# Patient Record
Sex: MALE | Race: BLACK OR AFRICAN AMERICAN | NOT HISPANIC OR LATINO | URBAN - METROPOLITAN AREA
[De-identification: names, ages, dates, MRNs, and addresses within clinical notes are randomized per-mention and may not be internally consistent; named-entity substitution may affect disease eponyms.]

---

## 2022-02-02 ENCOUNTER — HISTORICAL (OUTPATIENT)
Dept: INTERNAL MEDICINE | Facility: CLINIC | Age: 42
End: 2022-02-02

## 2022-02-02 LAB
ABS NEUT (OLG): 5.55 (ref 2.1–9.2)
ALBUMIN SERPL-MCNC: 3.6 G/DL (ref 3.5–5)
ALBUMIN/GLOB SERPL: 1.3 {RATIO} (ref 1.1–2)
ALP SERPL-CCNC: 63 U/L (ref 40–150)
ALT SERPL-CCNC: 23 U/L (ref 0–55)
APPEARANCE, UA: CLEAR
AST SERPL-CCNC: 19 U/L (ref 5–34)
BACTERIA SPEC CULT: NORMAL
BASOPHILS # BLD AUTO: 0.1 10*3/UL (ref 0–0.2)
BASOPHILS NFR BLD AUTO: 1 %
BILIRUB SERPL-MCNC: 0.5 MG/DL
BILIRUB UR QL STRIP: NEGATIVE
BILIRUBIN DIRECT+TOT PNL SERPL-MCNC: 0.2 (ref 0–0.5)
BILIRUBIN DIRECT+TOT PNL SERPL-MCNC: 0.3 (ref 0–0.8)
BUN SERPL-MCNC: 11.4 MG/DL (ref 8.9–20.6)
CALCIUM SERPL-MCNC: 9.3 MG/DL (ref 8.7–10.5)
CHLORIDE SERPL-SCNC: 109 MMOL/L (ref 98–107)
CHOLEST SERPL-MCNC: 150 MG/DL
CHOLEST/HDLC SERPL: 3 {RATIO} (ref 0–5)
CO2 SERPL-SCNC: 26 MMOL/L (ref 22–29)
COLOR UR: YELLOW
CREAT SERPL-MCNC: 1.03 MG/DL (ref 0.73–1.18)
EOSINOPHIL # BLD AUTO: 0.5 10*3/UL (ref 0–0.9)
EOSINOPHIL NFR BLD AUTO: 5 %
ERYTHROCYTE [DISTWIDTH] IN BLOOD BY AUTOMATED COUNT: 15.6 % (ref 11.5–14.5)
EST. AVERAGE GLUCOSE BLD GHB EST-MCNC: 111.2 MG/DL
FLAG2 (OHS): 60
FLAG3 (OHS): 90
FLAGS (OHS): 80
GLOBULIN SER-MCNC: 2.8 G/DL (ref 2.4–3.5)
GLUCOSE (UA): NORMAL
GLUCOSE SERPL-MCNC: 96 MG/DL (ref 74–100)
HBA1C MFR BLD: 5.5 %
HCT VFR BLD AUTO: 43.3 % (ref 40–51)
HDLC SERPL-MCNC: 49 MG/DL (ref 35–60)
HEMOLYSIS INTERF INDEX SERPL-ACNC: 5
HGB BLD-MCNC: 14.8 G/DL (ref 13.5–17.5)
HGB UR QL STRIP: NORMAL
HIV 1+2 AB+HIV1 P24 AG SERPL QL IA: 0.05
HIV 1+2 AB+HIV1 P24 AG SERPL QL IA: NONREACTIVE
HYALINE CASTS #/AREA URNS LPF: NORMAL /[LPF]
ICTERIC INTERF INDEX SERPL-ACNC: 0
IMM GRANULOCYTES # BLD AUTO: 0.01 10*3/UL
IMM GRANULOCYTES NFR BLD AUTO: 0 %
KETONES UR QL STRIP: NEGATIVE
LDLC SERPL CALC-MCNC: 84 MG/DL (ref 50–140)
LEUKOCYTE ESTERASE UR QL STRIP: NEGATIVE
LIPEMIC INTERF INDEX SERPL-ACNC: 6
LOW EVENT # SUSPECT FLAG (OHS): 80
LYMPHOCYTES # BLD AUTO: 2.8 10*3/UL (ref 0.6–4.6)
LYMPHOCYTES NFR BLD AUTO: 30 %
MANUAL DIFF? (OHS): NO
MCH RBC QN AUTO: 28.9 PG (ref 26–34)
MCHC RBC AUTO-ENTMCNC: 34.2 G/DL (ref 31–37)
MCV RBC AUTO: 84.6 FL (ref 80–100)
MO BLASTS SUSPECT FLAG (OHS): 40
MONOCYTES # BLD AUTO: 0.6 10*3/UL (ref 0.1–1.3)
MONOCYTES NFR BLD AUTO: 6 %
MUCOUS THREADS URNS QL MICRO: NORMAL
NEUTROPHILS # BLD AUTO: 5.55 10*3/UL (ref 2.1–9.2)
NEUTROPHILS NFR BLD AUTO: 59 %
NITRITE UR QL STRIP: NEGATIVE
NRBC BLD AUTO-RTO: 0 % (ref 0–0.2)
PH UR STRIP: 5.5 [PH] (ref 4.5–8)
PLATELET # BLD AUTO: 159 10*3/UL (ref 130–400)
PLATELET CLUMPS SUSPECT FLAG (OHS): 40
PMV BLD AUTO: 12.4 FL (ref 7.4–10.4)
POTASSIUM SERPL-SCNC: 4 MMOL/L (ref 3.5–5.1)
PROT SERPL-MCNC: 6.4 G/DL (ref 6.4–8.3)
PROT UR QL STRIP: NORMAL
PSA SERPL-MCNC: 0.79 NG/ML
RBC # BLD AUTO: 5.12 10*6/UL (ref 4.5–5.9)
RBC #/AREA URNS HPF: NORMAL /[HPF] (ref 0–5)
SODIUM SERPL-SCNC: 143 MMOL/L (ref 136–145)
SP GR UR STRIP: 1.03 (ref 1–1.03)
SQUAMOUS EPITHELIAL, UA: NORMAL
TRIGL SERPL-MCNC: 86 MG/DL (ref 34–140)
TSH SERPL-ACNC: 2.49 M[IU]/L (ref 0.35–4.94)
UROBILINOGEN UR STRIP-ACNC: NORMAL
VLDLC SERPL CALC-MCNC: 17 MG/DL
WBC # SPEC AUTO: 9.5 10*3/UL (ref 4.5–11)
WBC #/AREA URNS HPF: NORMAL /[HPF] (ref 0–5)

## 2022-04-08 ENCOUNTER — HISTORICAL (OUTPATIENT)
Dept: RADIOLOGY | Facility: HOSPITAL | Age: 42
End: 2022-04-08

## 2022-04-08 ENCOUNTER — HISTORICAL (OUTPATIENT)
Dept: ADMINISTRATIVE | Facility: HOSPITAL | Age: 42
End: 2022-04-08

## 2022-04-10 ENCOUNTER — HISTORICAL (OUTPATIENT)
Dept: ADMINISTRATIVE | Facility: HOSPITAL | Age: 42
End: 2022-04-10
Payer: MEDICAID

## 2022-04-29 VITALS
HEIGHT: 66 IN | SYSTOLIC BLOOD PRESSURE: 138 MMHG | WEIGHT: 235.69 LBS | DIASTOLIC BLOOD PRESSURE: 86 MMHG | BODY MASS INDEX: 37.88 KG/M2

## 2022-05-17 PROBLEM — I70.90 ATHEROSCLEROSIS OF ARTERIES: Status: ACTIVE | Noted: 2022-05-17

## 2022-05-17 PROBLEM — N30.90 CYSTITIS: Status: ACTIVE | Noted: 2022-05-17

## 2022-05-17 PROBLEM — M50.30 DDD (DEGENERATIVE DISC DISEASE), CERVICAL: Status: ACTIVE | Noted: 2022-05-17

## 2022-05-17 PROBLEM — M51.36 DDD (DEGENERATIVE DISC DISEASE), LUMBAR: Status: ACTIVE | Noted: 2022-05-17

## 2022-05-17 PROBLEM — I10 HYPERTENSION: Status: ACTIVE | Noted: 2022-05-17

## 2022-05-17 PROBLEM — E66.9 OBESITY: Status: ACTIVE | Noted: 2022-05-17

## 2022-05-17 PROBLEM — H54.40 BLINDNESS OF RIGHT EYE: Status: ACTIVE | Noted: 2022-05-17

## 2022-05-17 PROBLEM — R31.29 MICROHEMATURIA: Status: ACTIVE | Noted: 2022-05-17

## 2022-05-17 PROBLEM — M51.369 DDD (DEGENERATIVE DISC DISEASE), LUMBAR: Status: ACTIVE | Noted: 2022-05-17

## 2022-05-17 NOTE — ASSESSMENT & PLAN NOTE
Refer to cardiovascular specialist  Discussed adverse effect of TBO on CV health and encouraged cessation

## 2022-05-17 NOTE — PROGRESS NOTES
"  EMMANUEL Hooper   [facility]  [address] [facility] [address]      PATIENT NAME: Edin Branch  : 1980  DATE: 22  MRN: [id number]      Billing Provider: EMMANUEL Hooper  Level of Service:   Patient PCP Information     None on File          Reason for Visit / Chief Complaint: Follow-up (CT results)       History of Present Illness / Problem Focused Workflow     Edin Branch presents to the clinic with Follow-up (CT results)     Initial Visit (2021): 41 y.o. male presenting to [facility] to establish primary care.   Previous PCP: None   PmHx: TBO, DDD neck and lumbar spine   SHx: Denies   FHx: Oral cancer (mother, maternal grandfather; both smokers), throat cancer (maternal grandmother), HTN, DM, Heart Attack (father at age of 35 and paternal grandfather--age at death unknown, but  in )   Complaints today: Establish primary care.   ED visit on 21 with c/o paresthesias to left arm and left leg. He underwent CT C-spine and L-spine consistent with degenerative disc disease. He denies any major injuries or accidents during his lifetime, but admits playing football from childhood through high school. He is currently taking Gabapentin and Prednisone. He received a Toradol and steroid injection in the ED. States pain somewhat improved, but he is still experiencing some discomfort in LLE with prolonged ambulation. Denies falls.   Pt was also diagnosed with HTN during ED visit and prescribed Lisinopril 5 mg daily. SBP was noted as high as 200s. Pt admits that he never obtained the Lisinopril as he attributed the elevated BP to pain. States "never had it (HTN) before...I don't like a lot of medicine." BP improved today. He denies fever, chills, HA, weakness, dizziness, chest pain, SOB, lower ext edema, or blurred vision.    (2022): Pt presenting for f/u to review results. All labs reviewed and unremarkable.   PSA WNL   " "HgA1c 5.5   CBC, CMP, Lipid, TSH unremarkable or WNL   HIV, Hepatitis, GC negative   Trace blood on UA. Seen on UA years ago. Denies gross hematuria. He does smoke cigarettes.     Bp at goal today. He is still not taking the Lisinopril. He continues with c/o neck and lower back pain radiating to LLE. Previously given a trial of Mobic as he reported nothing worked in the past. States Mobic was not effective. He now has health insurance and is eager to go to [employer]. States ready to get back to work. No other concerns.    Today's Visit (5/18/2022): Pt presenting for f/u to review results of CT A/P. He underwent CT on 4/8/22 to further evaluate microhematuria. Significant findings included:  "There is no pelvic free fluid. The abdominal aorta is normal in  caliber. There is moderate atherosclerotic disease which is advanced  for patient's age. No retroperitoneal adenopathy.      Mild degenerative change of the spine primarily at L5-S1. Small  fat-containing paraumbilical hernia.     Impression.  1. Mild bladder wall thickening probably accentuated by under  distention but cystitis is possible.  2. No urinary tract calculi or enhancing masses."  Patient is a smoker. He denies dysuria, overt blood in urine, or abnl urine color or odor.       Review of Systems     Review of Systems   All other systems reviewed and are negative.      Medical / Social / Family History   History reviewed. No pertinent past medical history.    Past Surgical History:   Procedure Laterality Date    FINGER SURGERY         Social History    reports that he has been smoking cigarettes. He has been smoking about 0.50 packs per day. He has never used smokeless tobacco. He reports current alcohol use of about 6.0 standard drinks of alcohol per week. He reports previous drug use.    Family History  's family history includes Cancer in his mother; Diabetes in his brother and maternal grandmother; Heart attack in his father and paternal grandmother; " Hypertension in his maternal grandmother; Stomach cancer in his maternal grandfather.    Medications and Allergies     Medications  Outpatient Medications Marked as Taking for the 5/18/22 encounter (Office Visit) with EMMANUEL Hooper   Medication Sig Dispense Refill    aspirin/caffeine (CECILIA BACK AND BODY ORAL) Take by mouth.      cyclobenzaprine (FLEXERIL) 10 MG tablet Take 10 mg by mouth.         Allergies  Review of patient's allergies indicates:  No Known Allergies    Physical Examination     Vitals:    05/18/22 0807   BP: 126/81   Pulse: 65   Resp: 20   Temp: 97.8 °F (36.6 °C)     Physical Exam  Constitutional:       Appearance: Normal appearance.   HENT:      Head: Normocephalic and atraumatic.   Eyes:      Extraocular Movements: Extraocular movements intact.   Cardiovascular:      Rate and Rhythm: Normal rate and regular rhythm.      Heart sounds: Normal heart sounds.   Pulmonary:      Effort: Pulmonary effort is normal.      Breath sounds: Normal breath sounds.   Abdominal:      General: Bowel sounds are normal.      Palpations: Abdomen is soft.      Tenderness: There is no right CVA tenderness or left CVA tenderness.   Musculoskeletal:         General: Normal range of motion.   Skin:     General: Skin is warm and dry.   Neurological:      General: No focal deficit present.      Mental Status: He is alert and oriented to person, place, and time.   Psychiatric:         Mood and Affect: Mood normal.         Behavior: Behavior normal.         Thought Content: Thought content normal.         Judgment: Judgment normal.           Results     Lab Results   Component Value Date    WBC 9.5 02/02/2022    RBC 5.12 02/02/2022    HGB 14.8 02/02/2022    HCT 43.3 02/02/2022    MCV 84.6 02/02/2022    MCH 28.9 02/02/2022    MCHC 34.2 02/02/2022    RDW 15.6 02/02/2022     02/02/2022    MPV 12.4 02/02/2022     CMP  Sodium Level   Date Value Ref Range Status   02/02/2022 143 136 - 145      Potassium Level   Date  Value Ref Range Status   02/02/2022 4.0 3.5 - 5.1      Carbon Dioxide   Date Value Ref Range Status   02/02/2022 26 22 - 29      Blood Urea Nitrogen   Date Value Ref Range Status   02/02/2022 11.4 8.9 - 20.6      Creatinine   Date Value Ref Range Status   02/02/2022 1.03 0.73 - 1.18      Calcium Level Total   Date Value Ref Range Status   02/02/2022 9.3 8.7 - 10.5      Albumin Level   Date Value Ref Range Status   02/02/2022 3.6 3.5 - 5.0      Bilirubin Total   Date Value Ref Range Status   02/02/2022 0.5 <=1.5      Alkaline Phosphatase   Date Value Ref Range Status   02/02/2022 63 40 - 150      Aspartate Aminotransferase   Date Value Ref Range Status   02/02/2022 19 5 - 34      Alanine Aminotransferase   Date Value Ref Range Status   02/02/2022 23 0 - 55      Estimated GFR-Non    Date Value Ref Range Status   02/02/2022 85 >=90      Lab Results   Component Value Date    CHOL 150 02/02/2022     Lab Results   Component Value Date    HDL 49 02/02/2022     No results found for: LDLCALC  Lab Results   Component Value Date    TRIG 86 02/02/2022     No results found for: CHOLHDL  Lab Results   Component Value Date    TSH 2.4880 02/02/2022     Lab Results   Component Value Date    PHUR 5.5 02/02/2022    PROTEINUA Trace 02/02/2022    GLUCUA Normal 02/02/2022    KETONESU Negative 02/02/2022    OCCULTUA Trace 02/02/2022    NITRITE Negative 02/02/2022    LEUKOCYTESUR Negative 02/02/2022           Assessment and Plan (including Health Maintenance)     Plan:         Health Maintenance Due   Topic Date Due    Hepatitis C Screening  Never done    COVID-19 Vaccine (1) Never done    Pneumococcal Vaccines (Age 0-64) (1 - PCV) Never done    TETANUS VACCINE  Never done    High Dose Statin  Never done       Problem List Items Addressed This Visit        Cardiac/Vascular    Atherosclerosis of arteries    Current Assessment & Plan     Refer to cardiovascular specialist  Discussed adverse effect of TBO on CV health and  encouraged cessation           Relevant Orders    Ambulatory referral/consult to Cardiology       Renal/    Microhematuria    Overview     CT A/P 4/8/22:     Impression.  1. Mild bladder wall thickening probably accentuated by under  distention but cystitis is possible.  2. No urinary tract calculi or enhancing masses.                Current Assessment & Plan     Risk: TBO  Urine studies today  Refer to Uro           Relevant Orders    Ambulatory referral/consult to Urology    Cystitis    Current Assessment & Plan     Urine studies           Relevant Orders    Urinalysis, Reflex to Urine Culture Urine, Clean Catch    Urine culture          Health Maintenance Topics with due status: Not Due       Topic Last Completion Date    Lipid Panel 02/02/2022    Influenza Vaccine Not Due       Future Appointments   Date Time Provider Department Center   8/9/2022  8:15 AM EMMANUEL Hooper [facility] [place]            Signature:  EMMANUEL Hooper  [facility]  [address] [facility] [facility] [address]    Date of encounter: 5/18/22

## 2022-05-18 ENCOUNTER — OFFICE VISIT (OUTPATIENT)
Dept: INTERNAL MEDICINE | Facility: CLINIC | Age: 42
End: 2022-05-18
Payer: MEDICAID

## 2022-05-18 ENCOUNTER — LAB VISIT (OUTPATIENT)
Dept: LAB | Facility: HOSPITAL | Age: 42
End: 2022-05-18
Attending: NURSE PRACTITIONER
Payer: MEDICAID

## 2022-05-18 VITALS
SYSTOLIC BLOOD PRESSURE: 126 MMHG | HEART RATE: 65 BPM | RESPIRATION RATE: 20 BRPM | WEIGHT: 230.19 LBS | BODY MASS INDEX: 34.09 KG/M2 | HEIGHT: 69 IN | DIASTOLIC BLOOD PRESSURE: 81 MMHG | TEMPERATURE: 98 F

## 2022-05-18 DIAGNOSIS — N30.90 CYSTITIS: ICD-10-CM

## 2022-05-18 DIAGNOSIS — R31.29 MICROHEMATURIA: ICD-10-CM

## 2022-05-18 DIAGNOSIS — I70.90 ATHEROSCLEROSIS OF ARTERIES: ICD-10-CM

## 2022-05-18 LAB
APPEARANCE UR: CLEAR
BACTERIA #/AREA URNS AUTO: ABNORMAL /HPF
BILIRUB UR QL STRIP.AUTO: NEGATIVE MG/DL
COLOR UR AUTO: YELLOW
GLUCOSE UR QL STRIP.AUTO: NORMAL MG/DL
HYALINE CASTS #/AREA URNS LPF: ABNORMAL /LPF
KETONES UR QL STRIP.AUTO: ABNORMAL MG/DL
LEUKOCYTE ESTERASE UR QL STRIP.AUTO: 75 UNIT/L
MUCOUS THREADS URNS QL MICRO: ABNORMAL /LPF
NITRITE UR QL STRIP.AUTO: NEGATIVE
PH UR STRIP.AUTO: 6 [PH]
PROT UR QL STRIP.AUTO: ABNORMAL MG/DL
RBC #/AREA URNS AUTO: ABNORMAL /HPF
RBC UR QL AUTO: ABNORMAL UNIT/L
SP GR UR STRIP.AUTO: 1.03
SQUAMOUS #/AREA URNS LPF: ABNORMAL /HPF
UROBILINOGEN UR STRIP-ACNC: NORMAL MG/DL
WBC #/AREA URNS AUTO: ABNORMAL /HPF

## 2022-05-18 PROCEDURE — 1159F PR MEDICATION LIST DOCUMENTED IN MEDICAL RECORD: ICD-10-PCS | Mod: CPTII,,, | Performed by: NURSE PRACTITIONER

## 2022-05-18 PROCEDURE — 99214 OFFICE O/P EST MOD 30 MIN: CPT | Mod: PBBFAC | Performed by: NURSE PRACTITIONER

## 2022-05-18 PROCEDURE — 87088 URINE BACTERIA CULTURE: CPT

## 2022-05-18 PROCEDURE — 1159F MED LIST DOCD IN RCRD: CPT | Mod: CPTII,,, | Performed by: NURSE PRACTITIONER

## 2022-05-18 PROCEDURE — 3008F BODY MASS INDEX DOCD: CPT | Mod: CPTII,,, | Performed by: NURSE PRACTITIONER

## 2022-05-18 PROCEDURE — 3074F SYST BP LT 130 MM HG: CPT | Mod: CPTII,,, | Performed by: NURSE PRACTITIONER

## 2022-05-18 PROCEDURE — 3074F PR MOST RECENT SYSTOLIC BLOOD PRESSURE < 130 MM HG: ICD-10-PCS | Mod: CPTII,,, | Performed by: NURSE PRACTITIONER

## 2022-05-18 PROCEDURE — 99213 OFFICE O/P EST LOW 20 MIN: CPT | Mod: S$PBB,,, | Performed by: NURSE PRACTITIONER

## 2022-05-18 PROCEDURE — 1160F PR REVIEW ALL MEDS BY PRESCRIBER/CLIN PHARMACIST DOCUMENTED: ICD-10-PCS | Mod: CPTII,,, | Performed by: NURSE PRACTITIONER

## 2022-05-18 PROCEDURE — 81001 URINALYSIS AUTO W/SCOPE: CPT

## 2022-05-18 PROCEDURE — 3079F DIAST BP 80-89 MM HG: CPT | Mod: CPTII,,, | Performed by: NURSE PRACTITIONER

## 2022-05-18 PROCEDURE — 3008F PR BODY MASS INDEX (BMI) DOCUMENTED: ICD-10-PCS | Mod: CPTII,,, | Performed by: NURSE PRACTITIONER

## 2022-05-18 PROCEDURE — 99213 PR OFFICE/OUTPT VISIT, EST, LEVL III, 20-29 MIN: ICD-10-PCS | Mod: S$PBB,,, | Performed by: NURSE PRACTITIONER

## 2022-05-18 PROCEDURE — 3079F PR MOST RECENT DIASTOLIC BLOOD PRESSURE 80-89 MM HG: ICD-10-PCS | Mod: CPTII,,, | Performed by: NURSE PRACTITIONER

## 2022-05-18 PROCEDURE — 1160F RVW MEDS BY RX/DR IN RCRD: CPT | Mod: CPTII,,, | Performed by: NURSE PRACTITIONER

## 2022-05-18 RX ORDER — GABAPENTIN 300 MG/1
CAPSULE ORAL
COMMUNITY
Start: 2021-12-13

## 2022-05-18 RX ORDER — CYCLOBENZAPRINE HCL 10 MG
10 TABLET ORAL 3 TIMES DAILY PRN
COMMUNITY
Start: 2022-02-09 | End: 2023-02-09 | Stop reason: SDUPTHER

## 2022-05-18 NOTE — LETTER
May 18, 2022      [facility]  [address] [facility] [address] [facility] [address]  Phone: [phone]       Patient: Edin Branch   YOB: 1980  Date of Visit: 05/18/2022    To Whom It May Concern:    Sheeba Branch  was at [facility] on 05/18/2022. The patient may return to work/school on 5/19/2022 without restrictions. If you have any questions or concerns, or if I can be of further assistance, please do not hesitate to contact me.    Sincerely,    EMMANUEL Hooper

## 2022-05-20 ENCOUNTER — TELEPHONE (OUTPATIENT)
Dept: INTERNAL MEDICINE | Facility: CLINIC | Age: 42
End: 2022-05-20
Payer: MEDICAID

## 2022-05-20 LAB — BACTERIA UR CULT: NO GROWTH

## 2022-05-20 NOTE — TELEPHONE ENCOUNTER
----- Message from EMMANUEL Hooper sent at 5/20/2022  3:05 PM CDT -----  Please inform patient that urine culture was negative. Will await Urology appt.

## 2022-07-24 ENCOUNTER — HOSPITAL ENCOUNTER (EMERGENCY)
Facility: HOSPITAL | Age: 42
Discharge: HOME | End: 2022-07-25
Attending: FAMILY MEDICINE
Payer: MEDICAID

## 2022-07-24 DIAGNOSIS — U07.1 COVID: Primary | ICD-10-CM

## 2022-07-24 DIAGNOSIS — R07.9 CHEST PAIN: ICD-10-CM

## 2022-07-24 LAB
ALBUMIN SERPL-MCNC: 4.1 GM/DL (ref 3.5–5)
ALBUMIN/GLOB SERPL: 1.4 RATIO (ref 1.1–2)
ALP SERPL-CCNC: 70 UNIT/L (ref 40–150)
ALT SERPL-CCNC: 29 UNIT/L (ref 0–55)
AST SERPL-CCNC: 28 UNIT/L (ref 5–34)
BASOPHILS # BLD AUTO: 0.04 X10(3)/MCL (ref 0–0.2)
BASOPHILS NFR BLD AUTO: 0.4 %
BILIRUBIN DIRECT+TOT PNL SERPL-MCNC: 0.9 MG/DL
BNP BLD-MCNC: 25.3 PG/ML
BUN SERPL-MCNC: 9 MG/DL (ref 8.9–20.6)
CALCIUM SERPL-MCNC: 9.4 MG/DL (ref 8.4–10.2)
CHLORIDE SERPL-SCNC: 102 MMOL/L (ref 98–107)
CO2 SERPL-SCNC: 19 MMOL/L (ref 22–29)
CREAT SERPL-MCNC: 1.03 MG/DL (ref 0.73–1.18)
EOSINOPHIL # BLD AUTO: 0.05 X10(3)/MCL (ref 0–0.9)
EOSINOPHIL NFR BLD AUTO: 0.6 %
ERYTHROCYTE [DISTWIDTH] IN BLOOD BY AUTOMATED COUNT: 16 % (ref 11.5–17)
FLUAV AG UPPER RESP QL IA.RAPID: NOT DETECTED
FLUBV AG UPPER RESP QL IA.RAPID: NOT DETECTED
GLOBULIN SER-MCNC: 3 GM/DL (ref 2.4–3.5)
GLUCOSE SERPL-MCNC: 86 MG/DL (ref 74–100)
HCT VFR BLD AUTO: 48.9 % (ref 42–52)
HGB BLD-MCNC: 16.6 GM/DL (ref 14–18)
IMM GRANULOCYTES # BLD AUTO: 0.07 X10(3)/MCL (ref 0–0.04)
IMM GRANULOCYTES NFR BLD AUTO: 0.8 %
LYMPHOCYTES # BLD AUTO: 0.46 X10(3)/MCL (ref 0.6–4.6)
LYMPHOCYTES NFR BLD AUTO: 5.1 %
MCH RBC QN AUTO: 28.5 PG (ref 27–31)
MCHC RBC AUTO-ENTMCNC: 33.9 MG/DL (ref 33–36)
MCV RBC AUTO: 83.9 FL (ref 80–94)
MONOCYTES # BLD AUTO: 0.72 X10(3)/MCL (ref 0.1–1.3)
MONOCYTES NFR BLD AUTO: 8 %
NEUTROPHILS # BLD AUTO: 7.6 X10(3)/MCL (ref 2.1–9.2)
NEUTROPHILS NFR BLD AUTO: 85.1 %
NRBC BLD AUTO-RTO: 0 %
PLATELET # BLD AUTO: 133 X10(3)/MCL (ref 130–400)
PLATELETS.RETICULATED NFR BLD AUTO: 10.1 % (ref 0.9–11.2)
PMV BLD AUTO: 12.1 FL (ref 7.4–10.4)
POTASSIUM SERPL-SCNC: 3.9 MMOL/L (ref 3.5–5.1)
PROT SERPL-MCNC: 7.1 GM/DL (ref 6.4–8.3)
RBC # BLD AUTO: 5.83 X10(6)/MCL (ref 4.7–6.1)
SARS-COV-2 RNA RESP QL NAA+PROBE: DETECTED
SODIUM SERPL-SCNC: 135 MMOL/L (ref 136–145)
TROPONIN I SERPL-MCNC: <0.01 NG/ML (ref 0–0.04)
WBC # SPEC AUTO: 9 X10(3)/MCL (ref 4.5–11.5)

## 2022-07-24 PROCEDURE — 84484 ASSAY OF TROPONIN QUANT: CPT | Performed by: FAMILY MEDICINE

## 2022-07-24 PROCEDURE — 25000003 PHARM REV CODE 250: Performed by: FAMILY MEDICINE

## 2022-07-24 PROCEDURE — 87636 SARSCOV2 & INF A&B AMP PRB: CPT | Performed by: FAMILY MEDICINE

## 2022-07-24 PROCEDURE — 93005 ELECTROCARDIOGRAM TRACING: CPT

## 2022-07-24 PROCEDURE — 80053 COMPREHEN METABOLIC PANEL: CPT | Performed by: FAMILY MEDICINE

## 2022-07-24 PROCEDURE — 85025 COMPLETE CBC W/AUTO DIFF WBC: CPT | Performed by: FAMILY MEDICINE

## 2022-07-24 PROCEDURE — 36415 COLL VENOUS BLD VENIPUNCTURE: CPT | Performed by: FAMILY MEDICINE

## 2022-07-24 PROCEDURE — 83880 ASSAY OF NATRIURETIC PEPTIDE: CPT | Performed by: FAMILY MEDICINE

## 2022-07-24 PROCEDURE — 99285 EMERGENCY DEPT VISIT HI MDM: CPT | Mod: 25

## 2022-07-24 RX ORDER — IBUPROFEN 600 MG/1
600 TABLET ORAL ONCE
Status: COMPLETED | OUTPATIENT
Start: 2022-07-24 | End: 2022-07-24

## 2022-07-24 RX ADMIN — SODIUM CHLORIDE 1000 ML: 9 INJECTION, SOLUTION INTRAVENOUS at 09:07

## 2022-07-24 RX ADMIN — IBUPROFEN 600 MG: 600 TABLET ORAL at 09:07

## 2022-07-24 NOTE — Clinical Note
"Edin"Delonte Branch was seen and treated in our emergency department on 7/24/2022.     COVID-19 is present in our communities across the state. There is limited testing for COVID at this time, so not all patients can be tested. In this situation, your employee meets the following criteria:    Edin Branch has met the criteria for COVID-19 testing and has a POSITIVE result. He can return to work once they are asymptomatic for 24 hours without the use of fever reducing medications AND at least five days from the first positive result. A mask is recommended for 5 days post quarantine.     If you have any questions or concerns, or if I can be of further assistance, please do not hesitate to contact me.    Sincerely,             carmen encinas RN"

## 2022-07-25 VITALS
BODY MASS INDEX: 35.5 KG/M2 | HEIGHT: 66 IN | TEMPERATURE: 97 F | WEIGHT: 220.88 LBS | HEART RATE: 78 BPM | DIASTOLIC BLOOD PRESSURE: 63 MMHG | RESPIRATION RATE: 14 BRPM | SYSTOLIC BLOOD PRESSURE: 111 MMHG | OXYGEN SATURATION: 100 %

## 2022-07-25 NOTE — DISCHARGE INSTRUCTIONS
You came into emergency department with multiple symptoms and tested positive for COVID.  Otherwise her labs and chest x-ray were normal.    You can take the following medications to help you with the symptoms that you have:  For fevers and sore throat you can take Tylenol/ibuprofen.   For cough you can  a cough suppressant with dextromethorphan/guaifenesin (over-the-counter medications should have DM listed).  For nasal congestion/stuffiness you can try nasal sprays (Ex. Flonase) or try oral medications such as phenylephrine (over-the-counter medications should have PE listed).  For weakness and fatigue, I recommend good oral hydration with water/Pedialyte/Gatorade/Powerade.     Follow-up with her primary care doctor for re-evaluation.  Please quarantine herself so that other people do not get sick.  Return to the emergency department if chest pain, trouble breathing, feel lightheaded or dizzy like you may pass out.

## 2022-07-25 NOTE — ED PROVIDER NOTES
Name: Edin Branch   Age: 41 y.o.  Sex: male    Chief complaint:   Chief Complaint   Patient presents with    Chest Pain     Awokened this PM with chest pain radiating into ant. Jaw, N&V.      Patient arrived with: Private  History obtained from: Patient    Subjective:   41-year-old male with a history of CAD presents to the emergency department for chest pain.  Patient said all the symptoms started earlier today.  Pain is generalized in the chest, sharp in nature, waxes and wanes, nonradiating.  Pain is not worse with exertion.  He feels some occasional heaviness and a straw but it does not feel like his chest pain.  Complains of fever with a T-max of 101.9° for which he is taking Tylenol.  Also complains of chills and sweats.  Complains of a nonproductive cough and sore throat.  Has a mild headache.  No changes in vision or hearing.  Denies abdominal pain.  Complains of nausea with 2 episodes of nonbloody vomiting.  Denies diarrhea, dysuria, hematuria.  Patient has not been vaccinated for COVID.  No known sick contacts.    No past medical history on file.  Past Surgical History:   Procedure Laterality Date    FINGER SURGERY       Social History     Socioeconomic History    Marital status: Single    Number of children: 4    Highest education level: 10th grade   Tobacco Use    Smoking status: Current Every Day Smoker     Packs/day: 0.50     Types: Cigarettes    Smokeless tobacco: Never Used   Substance and Sexual Activity    Alcohol use: Yes     Alcohol/week: 6.0 standard drinks     Types: 6 Shots of liquor per week    Drug use: Not Currently    Sexual activity: Yes     Partners: Female     Review of patient's allergies indicates:  No Known Allergies     Review of Systems   Constitutional: Positive for chills, diaphoresis and fever.   HENT: Positive for sore throat. Negative for congestion.    Eyes: Negative for pain and discharge.   Respiratory: Positive for cough. Negative for shortness of breath.     Cardiovascular: Positive for chest pain. Negative for palpitations.   Gastrointestinal: Positive for vomiting. Negative for diarrhea.   Genitourinary: Negative for dysuria and hematuria.   Musculoskeletal: Negative for back pain and myalgias.   Skin: Negative for itching and rash.   Neurological: Positive for headaches. Negative for weakness.          Objective:     Vitals:    07/24/22 2014   BP: 118/78   Pulse: (!) 118   Resp: 20   Temp: 96.8 °F (36 °C)        Physical Exam  Constitutional:       Appearance: He is not toxic-appearing.   HENT:      Head: Normocephalic and atraumatic.   Cardiovascular:      Rate and Rhythm: Regular rhythm.      Pulses: Normal pulses.   Pulmonary:      Effort: Pulmonary effort is normal.      Breath sounds: Normal breath sounds.   Abdominal:      General: Abdomen is flat. Bowel sounds are normal.      Palpations: Abdomen is soft.      Tenderness: There is no right CVA tenderness or left CVA tenderness.   Musculoskeletal:         General: No deformity. Normal range of motion.      Cervical back: Normal range of motion and neck supple.      Right lower leg: No edema.      Left lower leg: No edema.   Skin:     General: Skin is warm and dry.   Neurological:      General: No focal deficit present.      Mental Status: He is alert and oriented to person, place, and time.   Psychiatric:         Mood and Affect: Mood normal.         Behavior: Behavior normal.          Records:  Nursing records and triage records reviewed  Prior records reviewed    Medical decision making:   Presents to the emergency department with multiple symptoms including chest pain, fever, chills, sweats, cough, sore throat, vomiting.  Patient has not been vaccinated for COVID.  He is nontoxic appearing.  Afebrile, tachycardic to 118, normotensive, saturating well on room air.  Lungs are clear to auscultation bilaterally, there is no signs of lower extremity edema.  Will get a cardiac workup in COVID swab for further  evaluation.  Patient will receive IV fluids and p.o. ibuprofen for symptomatic management.    ED Course as of 07/24/22 2332   Sun Jul 24, 2022 2206 EKG is nonischemic.  Troponin negative.  BNP within normal limits.  No leukocytosis, anemia, thrombocytopenia.  No severe electrolyte abnormality.  No JONES or hyperglycemia. [RK]   2231 COVID+ [RK]   2331 Chest x-ray normal.    Patient will be okay for discharge and follow up with his primary care doctor for re-evaluation.  We discussed quarantine instructions.  We discussed return precautions and I listed these in the discharge instructions.  Patient understands plan, no further questions, felt safe for discharge. [RK]      ED Course User Index  [RK] Benton Wong MD          EKG:  ECG Results          EKG 12-lead (Preliminary result)  Result time 07/24/22 21:35:33    ED Interpretation by Benton Wong MD (07/24/22 21:35:33, [facility])    Sinus tachycardia rate of 113, no signs of ST elevation or depression, normal axis, normal intervals with a QTC of 444                               Procedures       Diagnosis:  Final diagnoses:  [R07.9] Chest pain  [U07.1] COVID (Primary)     ED Prescriptions     None        Follow-up Information     Follow up With Specialties Details Why Contact Info    PCP  Schedule an appointment as soon as possible for a visit in 1 week Follow up with your primary care doctor for re-evaluation Follow up with your primary care doctor for re-evaluation          Benton Wong M.D.  Emergency Medicine Physician     (Please note that this chart was completed via voice to text dictation. There may be typographical errors or substitutions that are unintentional, or uncorrected. Every attempt was made to proofread the chart prior to completion. If there are any questions, please contact the physician for final clarification).       Benotn Wong MD  07/24/22 2332

## 2022-08-09 ENCOUNTER — OFFICE VISIT (OUTPATIENT)
Dept: INTERNAL MEDICINE | Facility: CLINIC | Age: 42
End: 2022-08-09
Payer: MEDICAID

## 2022-08-09 VITALS
TEMPERATURE: 98 F | HEIGHT: 66 IN | RESPIRATION RATE: 20 BRPM | DIASTOLIC BLOOD PRESSURE: 73 MMHG | SYSTOLIC BLOOD PRESSURE: 101 MMHG | WEIGHT: 213.19 LBS | BODY MASS INDEX: 34.26 KG/M2 | HEART RATE: 72 BPM

## 2022-08-09 DIAGNOSIS — Z13.1 SCREENING FOR DIABETES MELLITUS: ICD-10-CM

## 2022-08-09 DIAGNOSIS — Z00.00 WELLNESS EXAMINATION: Primary | ICD-10-CM

## 2022-08-09 DIAGNOSIS — R31.29 MICROHEMATURIA: ICD-10-CM

## 2022-08-09 DIAGNOSIS — Z11.3 ROUTINE SCREENING FOR STI (SEXUALLY TRANSMITTED INFECTION): ICD-10-CM

## 2022-08-09 DIAGNOSIS — Z72.0 TOBACCO USE: ICD-10-CM

## 2022-08-09 DIAGNOSIS — I10 HYPERTENSION, UNSPECIFIED TYPE: ICD-10-CM

## 2022-08-09 DIAGNOSIS — Z12.5 SCREENING FOR PROSTATE CANCER: ICD-10-CM

## 2022-08-09 DIAGNOSIS — Z86.16 HISTORY OF COVID-19: ICD-10-CM

## 2022-08-09 DIAGNOSIS — I70.90 ATHEROSCLEROSIS OF ARTERIES: ICD-10-CM

## 2022-08-09 PROBLEM — N30.90 CYSTITIS: Status: RESOLVED | Noted: 2022-05-17 | Resolved: 2022-08-09

## 2022-08-09 PROCEDURE — 3078F PR MOST RECENT DIASTOLIC BLOOD PRESSURE < 80 MM HG: ICD-10-PCS | Mod: CPTII,,, | Performed by: NURSE PRACTITIONER

## 2022-08-09 PROCEDURE — 99214 OFFICE O/P EST MOD 30 MIN: CPT | Mod: PBBFAC | Performed by: NURSE PRACTITIONER

## 2022-08-09 PROCEDURE — 3074F SYST BP LT 130 MM HG: CPT | Mod: CPTII,,, | Performed by: NURSE PRACTITIONER

## 2022-08-09 PROCEDURE — 1160F PR REVIEW ALL MEDS BY PRESCRIBER/CLIN PHARMACIST DOCUMENTED: ICD-10-PCS | Mod: CPTII,,, | Performed by: NURSE PRACTITIONER

## 2022-08-09 PROCEDURE — 3078F DIAST BP <80 MM HG: CPT | Mod: CPTII,,, | Performed by: NURSE PRACTITIONER

## 2022-08-09 PROCEDURE — 1159F MED LIST DOCD IN RCRD: CPT | Mod: CPTII,,, | Performed by: NURSE PRACTITIONER

## 2022-08-09 PROCEDURE — 99406 PR TOBACCO USE CESSATION INTERMEDIATE 3-10 MINUTES: ICD-10-PCS | Mod: S$PBB,,, | Performed by: NURSE PRACTITIONER

## 2022-08-09 PROCEDURE — 99406 BEHAV CHNG SMOKING 3-10 MIN: CPT | Mod: S$PBB,,, | Performed by: NURSE PRACTITIONER

## 2022-08-09 PROCEDURE — 3008F PR BODY MASS INDEX (BMI) DOCUMENTED: ICD-10-PCS | Mod: CPTII,,, | Performed by: NURSE PRACTITIONER

## 2022-08-09 PROCEDURE — 1160F RVW MEDS BY RX/DR IN RCRD: CPT | Mod: CPTII,,, | Performed by: NURSE PRACTITIONER

## 2022-08-09 PROCEDURE — 99214 OFFICE O/P EST MOD 30 MIN: CPT | Mod: S$PBB,25,, | Performed by: NURSE PRACTITIONER

## 2022-08-09 PROCEDURE — 1159F PR MEDICATION LIST DOCUMENTED IN MEDICAL RECORD: ICD-10-PCS | Mod: CPTII,,, | Performed by: NURSE PRACTITIONER

## 2022-08-09 PROCEDURE — 3008F BODY MASS INDEX DOCD: CPT | Mod: CPTII,,, | Performed by: NURSE PRACTITIONER

## 2022-08-09 PROCEDURE — 99214 PR OFFICE/OUTPT VISIT, EST, LEVL IV, 30-39 MIN: ICD-10-PCS | Mod: S$PBB,25,, | Performed by: NURSE PRACTITIONER

## 2022-08-09 PROCEDURE — 3074F PR MOST RECENT SYSTOLIC BLOOD PRESSURE < 130 MM HG: ICD-10-PCS | Mod: CPTII,,, | Performed by: NURSE PRACTITIONER

## 2022-08-09 NOTE — ASSESSMENT & PLAN NOTE
Extensive counseling on TBO cessation and untoward effects of TBO use on overall CV health and risk of cancers (ie., lung, bladder,oral/throat, etc). Spent > 3 min on counseling

## 2022-08-09 NOTE — PROGRESS NOTES
"  EMMANUEL Hooper   [facility]  [address] [facility] [address]      PATIENT NAME: Edin Branch  : 1980  DATE: 22  MRN: [id number]      Billing Provider: EMMANUEL Hooper  Level of Service:   Patient PCP Information     Provider PCP Type    EMMANUEL Hooper General          Reason for Visit / Chief Complaint: No chief complaint on file.       History of Present Illness / Problem Focused Workflow     Edin Branch presents to the clinic with No chief complaint on file.     Initial Visit (2021): 41 y.o. male presenting to [facility] [place] primary care.   Previous PCP: None   PmHx: TBO, DDD neck and lumbar spine   SHx: Denies   FHx: Oral cancer (mother, maternal grandfather; both smokers), throat cancer (maternal grandmother), HTN, DM, Heart Attack (father at age of 35 and paternal grandfather--age at death unknown, but  in )   Complaints today: Establish primary care.   ED visit on 21 with c/o paresthesias to left arm and left leg. He underwent CT C-spine and L-spine consistent with degenerative disc disease. He denies any major injuries or accidents during his lifetime, but admits playing football from childhood through high school. He is currently taking Gabapentin and Prednisone. He received a Toradol and steroid injection in the ED. States pain somewhat improved, but he is still experiencing some discomfort in LLE with prolonged ambulation. Denies falls.   Pt was also diagnosed with HTN during ED visit and prescribed Lisinopril 5 mg daily. SBP was noted as high as 200s. Pt admits that he never obtained the Lisinopril as he attributed the elevated BP to pain. States "never had it (HTN) before...I don't like a lot of medicine." BP improved today. He denies fever, chills, HA, weakness, dizziness, chest pain, SOB, lower ext edema, or blurred vision.    (2022): Pt presenting for f/u to review results. All " "labs reviewed and unremarkable.   PSA WNL   HgA1c 5.5   CBC, CMP, Lipid, TSH unremarkable or WNL   HIV, Hepatitis, GC negative   Trace blood on UA. Seen on UA years ago. Denies gross hematuria. He does smoke cigarettes.     Bp at goal today. He is still not taking the Lisinopril. He continues with c/o neck and lower back pain radiating to LLE. Previously given a trial of Mobic as he reported nothing worked in the past. States Mobic was not effective. He now has health insurance and is eager to go to [employer]. States ready to get back to work. No other concerns.    Today's Visit (8/9/2022): Pt presenting for f/u to review CT A/P done to beena JOSHI. CT done 4/8/22:  "No renal calculi are seen. There is no hydronephrosis. There is no  suspicious renal lesion. No suspicious ureteral filling defect is  seen. Mild bladder wall thickening probably accentuated by under  distention. The prostate is not significantly enlarged.   There is no bowel obstruction. Normal appendix. No significant  inflammatory changes of the bowel.  There is no pelvic free fluid. The abdominal aorta is normal in  caliber. There is moderate atherosclerotic disease which is advanced  for patient's age. No retroperitoneal adenopathy.   Mild degenerative change of the spine primarily at L5-S1. Small  fat-containing paraumbilical hernia.     Impression.  1. Mild bladder wall thickening probably accentuated by under  distention but cystitis is possible.  2. No urinary tract calculi or enhancing masses."         Review of Systems     Review of Systems    Medical / Social / Family History   No past medical history on file.    Past Surgical History:   Procedure Laterality Date    FINGER SURGERY         Social History    reports that he has been smoking cigarettes. He has been smoking about 0.50 packs per day. He has never used smokeless tobacco. He reports current alcohol use of about 6.0 standard drinks of alcohol per week. He reports previous drug " use.    Family History  's family history includes Cancer in his mother; Diabetes in his brother and maternal grandmother; Heart attack in his father and paternal grandmother; Hypertension in his maternal grandmother; Stomach cancer in his maternal grandfather.    Medications and Allergies     Medications  No outpatient medications have been marked as taking for the 8/9/22 encounter (Appointment) with EMMANUEL Hooper.       Allergies  Review of patient's allergies indicates:  No Known Allergies    Physical Examination   There were no vitals filed for this visit.  Physical Exam      Results     Lab Results   Component Value Date    WBC 9.0 07/24/2022    RBC 5.83 07/24/2022    HGB 16.6 07/24/2022    HCT 48.9 07/24/2022    MCV 83.9 07/24/2022    MCH 28.5 07/24/2022    MCHC 33.9 07/24/2022    RDW 16.0 07/24/2022     07/24/2022    MPV 12.1 (H) 07/24/2022     CMP  Sodium Level   Date Value Ref Range Status   07/24/2022 135 (L) 136 - 145 mmol/L Final     Potassium Level   Date Value Ref Range Status   07/24/2022 3.9 3.5 - 5.1 mmol/L Final     Carbon Dioxide   Date Value Ref Range Status   07/24/2022 19 (L) 22 - 29 mmol/L Final     Blood Urea Nitrogen   Date Value Ref Range Status   07/24/2022 9.0 8.9 - 20.6 mg/dL Final     Creatinine   Date Value Ref Range Status   07/24/2022 1.03 0.73 - 1.18 mg/dL Final     Calcium Level Total   Date Value Ref Range Status   07/24/2022 9.4 8.4 - 10.2 mg/dL Final     Albumin Level   Date Value Ref Range Status   07/24/2022 4.1 3.5 - 5.0 gm/dL Final     Bilirubin Total   Date Value Ref Range Status   07/24/2022 0.9 <=1.5 mg/dL Final     Alkaline Phosphatase   Date Value Ref Range Status   07/24/2022 70 40 - 150 unit/L Final     Aspartate Aminotransferase   Date Value Ref Range Status   07/24/2022 28 5 - 34 unit/L Final     Alanine Aminotransferase   Date Value Ref Range Status   07/24/2022 29 0 - 55 unit/L Final     Estimated GFR-Non    Date Value Ref Range  Status   02/02/2022 85 >=90      Lab Results   Component Value Date    CHOL 150 02/02/2022     Lab Results   Component Value Date    HDL 49 02/02/2022     No results found for: LDLCALC  Lab Results   Component Value Date    TRIG 86 02/02/2022     No results found for: CHOLHDL  Lab Results   Component Value Date    TSH 2.4880 02/02/2022     Lab Results   Component Value Date    PHUR 5.5 02/02/2022    PROTEINUA 1+ (A) 05/18/2022    GLUCUA Normal 02/02/2022    KETONESU Negative 02/02/2022    OCCULTUA Trace 02/02/2022    NITRITE Negative 02/02/2022    LEUKOCYTESUR 75  05/18/2022           Assessment and Plan (including Health Maintenance)     Plan:         Health Maintenance Due   Topic Date Due    Hepatitis C Screening  Never done    COVID-19 Vaccine (1) Never done    Pneumococcal Vaccines (Age 0-64) (1 - PCV) Never done    TETANUS VACCINE  Never done    High Dose Statin  Never done       Problem List Items Addressed This Visit    None         Health Maintenance Topics with due status: Not Due       Topic Last Completion Date    Lipid Panel 02/02/2022    Influenza Vaccine Not Due       Future Appointments   Date Time Provider Department Center   8/9/2022  8:15 AM [facility]EL [facility]oper [facility]   10/20/2022  8:40 AM UROLOGY CLINIC 3 [facility] [place] [facility]            Signature:  EMMANUEL Hooper  [facility]  [address] [facility] [address]    Date of encounter: 8/9/22

## 2022-08-09 NOTE — PROGRESS NOTES
"  EMMANUEL Hooper   [facility]  [address] [facility] [address]      PATIENT NAME: Edin Branch  : 1980  DATE: 22  MRN: [id number]      Billing Provider: EMMANUEL Hooper  Level of Service:   Patient PCP Information     None on File          Reason for Visit / Chief Complaint: Follow-up (CT results)       History of Present Illness / Problem Focused Workflow     Edin Branch presents to the clinic with Follow-up (CT results)     Initial Visit (2021): 41 y.o. male [place] [facility] [place]   Previous PCP: None   PmHx: TBO, DDD neck and lumbar spine   SHx: Denies   FHx: Oral cancer (mother, maternal grandfather; both smokers), throat cancer (maternal grandmother), HTN, DM, Heart Attack (father at age of 35 and paternal grandfather--age at death unknown, but  in )   Complaints today: Establish primary care.   ED visit on 21 with c/o paresthesias to left arm and left leg. He underwent CT C-spine and L-spine consistent with degenerative disc disease. He denies any major injuries or accidents during his lifetime, but admits playing football from childhood through high school. He is currently taking Gabapentin and Prednisone. He received a Toradol and steroid injection in the ED. States pain somewhat improved, but he is still experiencing some discomfort in LLE with prolonged ambulation. Denies falls.   Pt was also diagnosed with HTN during ED visit and prescribed Lisinopril 5 mg daily. SBP was noted as high as 200s. Pt admits that he never obtained the Lisinopril as he attributed the elevated BP to pain. States "never had it (HTN) before...I don't like a lot of medicine." BP improved today. He denies fever, chills, HA, weakness, dizziness, chest pain, SOB, lower ext edema, or blurred vision.    (2022): Pt presenting for f/u to review results. All labs reviewed and unremarkable.   PSA WNL   " "HgA1c 5.5   CBC, CMP, Lipid, TSH unremarkable or WNL   HIV, Hepatitis, GC negative   Trace blood on UA. Seen on UA years ago. Denies gross hematuria. He does smoke cigarettes.     Bp at goal today. He is still not taking the Lisinopril. He continues with c/o neck and lower back pain radiating to LLE. Previously given a trial of Mobic as he reported nothing worked in the past. States Mobic was not effective. He now has health insurance and is eager to go to [employer]. States ready to get back to work. No other concerns.    (5/18/2022): Pt presenting for f/u to review results of CT A/P. He underwent CT on 4/8/22 to further evaluate microhematuria. Significant findings included:  "There is no pelvic free fluid. The abdominal aorta is normal in  caliber. There is moderate atherosclerotic disease which is advanced  for patient's age. No retroperitoneal adenopathy.      Mild degenerative change of the spine primarily at L5-S1. Small  fat-containing paraumbilical hernia.     Impression.  1. Mild bladder wall thickening probably accentuated by under  distention but cystitis is possible.  2. No urinary tract calculi or enhancing masses."  Patient is a smoker. He denies dysuria, overt blood in urine, or abnl urine color or odor.     Today's Visit (8/9/2022): Pt presenting for routine f/u. Lab results pending. Since last visit, he's been referred to Dr. Flores/[place] for PVD. States had one visit. Scheduled for calcium test, treadmill stress test, and LE US but had to reschedule them all due to COVID-19 infection 7/24/22. States most tests rescheduled for later this month. He's also been referred to Uro for MSH. Appt 10/20/22. As previously stated, he visited the ED on 7/24/22 with c/o fever, CP, and jaw pain. TST revealed sinus tach, otherwise normal. CXR normal. He tested + for COVID. Overall, recovered. States still fatigued from time to time with occasional HA, but no chest pain, SOB, or LE swelling.    Follow-up        Review of " Systems     Review of Systems   All other systems reviewed and are negative.      Medical / Social / Family History   History reviewed. No pertinent past medical history.    Past Surgical History:   Procedure Laterality Date    FINGER SURGERY         Social History    reports that he has been smoking cigarettes. He has been smoking about 0.50 packs per day. He has never used smokeless tobacco. He reports current alcohol use of about 6.0 standard drinks of alcohol per week. He reports previous drug use.    Family History  's family history includes Cancer in his mother; Diabetes in his brother and maternal grandmother; Heart attack in his father and paternal grandmother; Hypertension in his maternal grandmother; Stomach cancer in his maternal grandfather.    Medications and Allergies     Medications  Medication List with Changes/Refills   Current Medications    ASPIRIN/CAFFEINE (CECILIA BACK AND BODY ORAL)    Take by mouth.    CYCLOBENZAPRINE (FLEXERIL) 10 MG TABLET    Take 10 mg by mouth 3 (three) times daily as needed for Muscle spasms.    GABAPENTIN (NEURONTIN) 300 MG CAPSULE    SMARTSI Capsule(s) By Mouth Every 12 Hours PRN       Allergies  Review of patient's allergies indicates:  No Known Allergies    Physical Examination     Vitals:    22 0817   BP: 101/73   Pulse: 72   Resp: 20   Temp: 98.1 °F (36.7 °C)     Physical Exam  Constitutional:       Appearance: Normal appearance.   HENT:      Head: Normocephalic and atraumatic.   Eyes:      Extraocular Movements: Extraocular movements intact.   Cardiovascular:      Rate and Rhythm: Normal rate and regular rhythm.      Pulses: Normal pulses.   Pulmonary:      Effort: Pulmonary effort is normal.   Musculoskeletal:         General: Normal range of motion.      Cervical back: Normal range of motion.   Skin:     General: Skin is warm and dry.   Neurological:      General: No focal deficit present.      Mental Status: He is alert and oriented to person,  place, and time.   Psychiatric:         Mood and Affect: Mood normal.         Behavior: Behavior normal.         Thought Content: Thought content normal.         Judgment: Judgment normal.           Results     Lab Results   Component Value Date    WBC 9.0 07/24/2022    RBC 5.83 07/24/2022    HGB 16.6 07/24/2022    HCT 48.9 07/24/2022    MCV 83.9 07/24/2022    MCH 28.5 07/24/2022    MCHC 33.9 07/24/2022    RDW 16.0 07/24/2022     07/24/2022    MPV 12.1 (H) 07/24/2022     CMP  Sodium Level   Date Value Ref Range Status   08/09/2022 141 136 - 145 mmol/L Final     Potassium Level   Date Value Ref Range Status   08/09/2022 3.7 3.5 - 5.1 mmol/L Final     Carbon Dioxide   Date Value Ref Range Status   08/09/2022 24 22 - 29 mmol/L Final     Blood Urea Nitrogen   Date Value Ref Range Status   08/09/2022 10.6 8.9 - 20.6 mg/dL Final     Creatinine   Date Value Ref Range Status   08/09/2022 1.17 0.73 - 1.18 mg/dL Final     Calcium Level Total   Date Value Ref Range Status   08/09/2022 9.6 8.4 - 10.2 mg/dL Final     Albumin Level   Date Value Ref Range Status   07/24/2022 4.1 3.5 - 5.0 gm/dL Final     Bilirubin Total   Date Value Ref Range Status   07/24/2022 0.9 <=1.5 mg/dL Final     Alkaline Phosphatase   Date Value Ref Range Status   07/24/2022 70 40 - 150 unit/L Final     Aspartate Aminotransferase   Date Value Ref Range Status   07/24/2022 28 5 - 34 unit/L Final     Alanine Aminotransferase   Date Value Ref Range Status   07/24/2022 29 0 - 55 unit/L Final     Estimated GFR-Non    Date Value Ref Range Status   02/02/2022 85 >=90      Lab Results   Component Value Date    CHOL 150 02/02/2022     Lab Results   Component Value Date    HDL 49 02/02/2022     No results found for: LDLCALC  Lab Results   Component Value Date    TRIG 86 02/02/2022     No results found for: CHOLHDL  Lab Results   Component Value Date    TSH 2.4880 02/02/2022     Lab Results   Component Value Date    PHUR 5.5 02/02/2022     PROTEINUA 1+ (A) 05/18/2022    GLUCUA Normal 02/02/2022    KETONESU Negative 02/02/2022    OCCULTUA Trace 02/02/2022    NITRITE Negative 02/02/2022    LEUKOCYTESUR 75  05/18/2022           Assessment and Plan (including Health Maintenance)     Plan:         Health Maintenance Due   Topic Date Due    COVID-19 Vaccine (1) Never done    Pneumococcal Vaccines (Age 0-64) (1 - PCV) Never done    TETANUS VACCINE  Never done    High Dose Statin  Never done       Problem List Items Addressed This Visit        Cardiac/Vascular    Hypertension    Current Assessment & Plan     At goal  Continue current regimen           Atherosclerosis of arteries    Current Assessment & Plan     F/u with Dr. Flores's office for further eval and tx              Renal/    Microhematuria    Overview     CT A/P 4/8/22:     Impression.  1. Mild bladder wall thickening probably accentuated by under  distention but cystitis is possible.  2. No urinary tract calculi or enhancing masses.                Current Assessment & Plan     Complete TBO cessation  Keep appt in Uro in October              ID    History of COVID-19    Current Assessment & Plan     Resolved              Other    Tobacco use    Current Assessment & Plan     Extensive counseling on TBO cessation and untoward effects of TBO use on overall CV health and risk of cancers (ie., lung, bladder,oral/throat, etc). Spent > 3 min on counseling             Other Visit Diagnoses     Wellness examination    -  Primary    Relevant Orders    CBC Auto Differential    Comprehensive Metabolic Panel    Lipid Panel    TSH    Urinalysis, Reflex to Urine Culture Urine, Clean Catch    Routine screening for STI (sexually transmitted infection)        Relevant Orders    Chlamydia/GC, PCR    Hepatitis Panel, Acute    HIV 1/2 Ag/Ab (4th Gen)    SYPHILIS ANTIBODY (WITH REFLEX RPR)    Screening for prostate cancer        Relevant Orders    PSA, Screening    Screening for diabetes mellitus        Relevant  Orders    Hemoglobin A1C          Health Maintenance Topics with due status: Not Due       Topic Last Completion Date    Lipid Panel 02/02/2022    Influenza Vaccine Not Due       Future Appointments   Date Time Provider Department Center   10/20/2022  8:40 AM UROLOGY CLINIC 3 [facility] [place] [facility]   2/9/2023  7:30 AM EMMANUEL Hooper [facility] [place] [facility]            Signature:  EMMANUEL Hooper  [facility]  [address] [address]    Date of encounter: 8/9/22

## 2022-10-20 ENCOUNTER — PROCEDURE VISIT (OUTPATIENT)
Dept: UROLOGY | Facility: CLINIC | Age: 42
End: 2022-10-20
Payer: MEDICAID

## 2022-10-20 VITALS
SYSTOLIC BLOOD PRESSURE: 100 MMHG | RESPIRATION RATE: 20 BRPM | HEART RATE: 75 BPM | BODY MASS INDEX: 34.87 KG/M2 | OXYGEN SATURATION: 100 % | DIASTOLIC BLOOD PRESSURE: 76 MMHG | WEIGHT: 217 LBS | TEMPERATURE: 98 F | HEIGHT: 66 IN

## 2022-10-20 DIAGNOSIS — R31.21 ASYMPTOMATIC MICROSCOPIC HEMATURIA: Primary | ICD-10-CM

## 2022-10-20 PROCEDURE — 52000 CYSTOSCOPY: ICD-10-PCS | Mod: S$PBB,,, | Performed by: UROLOGY

## 2022-10-20 PROCEDURE — 52000 CYSTOURETHROSCOPY: CPT | Mod: PBBFAC | Performed by: UROLOGY

## 2022-10-20 RX ORDER — CIPROFLOXACIN 500 MG/1
500 TABLET ORAL
Status: COMPLETED | OUTPATIENT
Start: 2022-10-20 | End: 2022-10-20

## 2022-10-20 RX ORDER — LIDOCAINE HYDROCHLORIDE 20 MG/ML
JELLY TOPICAL
Status: COMPLETED | OUTPATIENT
Start: 2022-10-20 | End: 2022-10-20

## 2022-10-20 RX ADMIN — LIDOCAINE HYDROCHLORIDE: 20 JELLY TOPICAL at 09:10

## 2022-10-20 RX ADMIN — CIPROFLOXACIN 500 MG: 500 TABLET ORAL at 09:10

## 2022-10-20 NOTE — PROGRESS NOTES
Patient seen by Dr. Oseguera. Consent and timeout completed. Assisted Dr. Oseguera with procedure. Patient tolerated well. RTC 6 months. Discharge instructions given verbal and written.

## 2022-10-20 NOTE — PROCEDURES
Cystoscopy    Date/Time: 10/20/2022 8:15 AM  Performed by: Kristine Oseguera DO  Authorized by: Kristine Oseguera DO   CC:  Cystoscopy    HPI:  Edin Branch is a 41 y.o. male here for a cystoscopy for microscopic hematuria.  He was found to have microscopic hematuria on routine urinalysis.  He denies any episodes of gross hematuria.  He has no urinary complaints.  Urinalysis on 18 May shows a trace of blood.  Urine culture was negative.  CT scan on eight April 2022 shows mild bladder wall thickening.  The remainder of the urinary tract was negative.  PSA on 2 February 2022 was 0.79.    Data Review:  Referral and notes from 9 August 2022 from [facility].  PSA, CT scan and urinalysis.    ROS:  All systems reviewed and are negative except as documented in HPI and/or Assessment and Plan.     Patient Active Problem List:     Patient Active Problem List   Diagnosis    Hypertension    Obesity    Blindness of right eye    DDD (degenerative disc disease), lumbar    DDD (degenerative disc disease), cervical    Atherosclerosis of arteries    Microhematuria    History of COVID-19    Tobacco use        Past Medical History:  No past medical history on file.     Past Surgical History:  Past Surgical History:   Procedure Laterality Date    FINGER SURGERY          Family History:  Family History   Problem Relation Age of Onset    Cancer Mother     Heart attack Father     Diabetes Brother     Diabetes Maternal Grandmother     Hypertension Maternal Grandmother     Stomach cancer Maternal Grandfather     Heart attack Paternal Grandmother         Social History:  Social History     Socioeconomic History    Marital status: Single    Number of children: 4    Highest education level: 10th grade   Tobacco Use    Smoking status: Every Day     Packs/day: 1.50     Types: Cigarettes    Smokeless tobacco: Never   Substance and Sexual Activity    Alcohol use: Yes     Alcohol/week: 6.0 standard drinks     Types: 6 Cans of beer per week    Drug  use: Not Currently    Sexual activity: Yes     Partners: Female        Allergies:  Review of patient's allergies indicates:  No Known Allergies     Objective:  Vitals:    10/20/22 0856   BP: 100/76   Pulse: 75   Resp: 20   Temp: 97.9 °F (36.6 °C)     General:  Well developed, well nourished adult male in no acute distress  Abdomen: Soft, nontender, no masses  Extremities:  No clubbing, cyanosis, or edema  Neurologic:  Grossly intact  Musculoskeletal:  Normal tone  Penis:  Circumcised, no lesions  Testicles:  Nontender, no masses  Epididymis:  Normal without masses  Prostate exam:  Nontender, symmetrical, no nodules  Rectal:  Normal rectal tone    Cystoscopy:       - Penis:  Circumcised, no lesions        - Urethral meatus:  No strictures        - Urethra:  Normal without strictures or lesions           - Prostate:  Not enlarged        - Bladder neck:  Normal        - Bladder:  No mucosal abnormalities        - Ureteral orifices:  On the trigone with clear efflux bilaterally    The patient tolerated the procedure well without complications.  He was given Cipro 500mg, one tablet in the clinic.   The urethra was anesthetized with 2% Lidocaine Jelly, Urojet.      Lab Results:  See HPI.    Imaging:  See HPI.     Assessment:  1. Asymptomatic microscopic hematuria  - Ambulatory referral/consult to Urology  - LIDOcaine HCl 2% urojet  - ciprofloxacin HCl tablet 500 mg     Plan:  Return in six months for urinalysis and cytology.     Follow-up:  Six months for urinalysis and cytology.

## 2022-10-20 NOTE — LETTER
October 20, 2022      [facility]  [address] [address]  Phone: [phone]       Patient: Edin Branch   YOB: 1980  Date of Visit: 10/20/2022    To Whom It May Concern:    Sheeba Branch  was at [facility] on 10/20/2022. The patient may return to work/school on 10/21/22 with no restrictions. If you have any questions or concerns, or if I can be of further assistance, please do not hesitate to contact me.    Sincerely,    Akilah Thurman LPN      Reason for call:  Patient reporting a symptom    Symptom or request: UTI and its back    Duration (how long have symptoms been present): not as back but mild for  The last 6 days ago    Have you been treated for this before? Yes    Additional comments:       Phone Number patient can be reached at:  Home number on file [phone] (home)    Best Time:  anytime    Can we leave a detailed message on this number:  YES    Call taken on 1/10/2017 at 6:51 PM by Tyesha Tamez

## 2022-12-29 ENCOUNTER — OFFICE VISIT (OUTPATIENT)
Dept: URGENT CARE | Facility: CLINIC | Age: 42
End: 2022-12-29
Payer: MEDICAID

## 2022-12-29 VITALS
SYSTOLIC BLOOD PRESSURE: 123 MMHG | HEART RATE: 70 BPM | BODY MASS INDEX: 35.2 KG/M2 | RESPIRATION RATE: 20 BRPM | TEMPERATURE: 98 F | OXYGEN SATURATION: 100 % | HEIGHT: 66 IN | DIASTOLIC BLOOD PRESSURE: 88 MMHG | WEIGHT: 219 LBS

## 2022-12-29 DIAGNOSIS — R68.89 FLU-LIKE SYMPTOMS: ICD-10-CM

## 2022-12-29 DIAGNOSIS — Z11.52 ENCOUNTER FOR SCREENING FOR SEVERE ACUTE RESPIRATORY SYNDROME CORONAVIRUS 2 (SARS-COV-2) INFECTION: ICD-10-CM

## 2022-12-29 DIAGNOSIS — R05.9 COUGH, UNSPECIFIED TYPE: ICD-10-CM

## 2022-12-29 DIAGNOSIS — J01.90 ACUTE SINUSITIS, RECURRENCE NOT SPECIFIED, UNSPECIFIED LOCATION: Primary | ICD-10-CM

## 2022-12-29 LAB
CTP QC/QA: YES
CTP QC/QA: YES
FLUAV AG NPH QL: NEGATIVE
FLUBV AG NPH QL: NEGATIVE
SARS-COV-2 RDRP RESP QL NAA+PROBE: NEGATIVE

## 2022-12-29 PROCEDURE — 87635 SARS-COV-2 COVID-19 AMP PRB: CPT | Mod: PBBFAC | Performed by: NURSE PRACTITIONER

## 2022-12-29 PROCEDURE — 87804 INFLUENZA ASSAY W/OPTIC: CPT | Mod: 59,PBBFAC | Performed by: NURSE PRACTITIONER

## 2022-12-29 PROCEDURE — 99213 OFFICE O/P EST LOW 20 MIN: CPT | Mod: PBBFAC | Performed by: NURSE PRACTITIONER

## 2022-12-29 PROCEDURE — 99213 PR OFFICE/OUTPT VISIT, EST, LEVL III, 20-29 MIN: ICD-10-PCS | Mod: S$PBB,,, | Performed by: NURSE PRACTITIONER

## 2022-12-29 PROCEDURE — 99213 OFFICE O/P EST LOW 20 MIN: CPT | Mod: S$PBB,,, | Performed by: NURSE PRACTITIONER

## 2022-12-29 RX ORDER — LEVOCETIRIZINE DIHYDROCHLORIDE 5 MG/1
5 TABLET, FILM COATED ORAL NIGHTLY
Qty: 14 TABLET | Refills: 0 | Status: SHIPPED | OUTPATIENT
Start: 2022-12-29 | End: 2023-02-09

## 2022-12-29 RX ORDER — DEXAMETHASONE SODIUM PHOSPHATE 100 MG/10ML
8 INJECTION INTRAMUSCULAR; INTRAVENOUS
Status: COMPLETED | OUTPATIENT
Start: 2022-12-29 | End: 2022-12-29

## 2022-12-29 RX ORDER — FLUTICASONE PROPIONATE 50 MCG
1 SPRAY, SUSPENSION (ML) NASAL DAILY
Qty: 16 ML | Refills: 0 | Status: SHIPPED | OUTPATIENT
Start: 2022-12-29 | End: 2023-02-09

## 2022-12-29 RX ORDER — PROMETHAZINE HYDROCHLORIDE AND DEXTROMETHORPHAN HYDROBROMIDE 6.25; 15 MG/5ML; MG/5ML
5 SYRUP ORAL EVERY 6 HOURS PRN
Qty: 100 ML | Refills: 0 | Status: SHIPPED | OUTPATIENT
Start: 2022-12-29 | End: 2023-02-09

## 2022-12-29 RX ORDER — AMOXICILLIN 875 MG/1
875 TABLET, FILM COATED ORAL 2 TIMES DAILY
Qty: 20 TABLET | Refills: 0 | Status: SHIPPED | OUTPATIENT
Start: 2022-12-29 | End: 2023-01-08

## 2022-12-29 RX ADMIN — DEXAMETHASONE SODIUM PHOSPHATE 8 MG: 100 INJECTION INTRAMUSCULAR; INTRAVENOUS at 01:12

## 2022-12-29 NOTE — LETTER
December 29, 2022      [facility]  [address] [facility] [address]  Phone: [phone]       Patient: Edin Branch   YOB: 1980  Date of Visit: 12/29/2022    To Whom It May Concern:    Sheeba Branch  was at [facility] on 12/29/2022. The patient may return to work/school on December 31, 2022 with no restrictions. If you have any questions or concerns, or if I can be of further assistance, please do not hesitate to contact me.    Sincerely,    Oscar Hurtado, NP

## 2022-12-29 NOTE — PROGRESS NOTES
"Subjective:       Patient ID: Edin Branch is a 42 y.o. male.    Vitals:  height is 5' 6" (1.676 m) and weight is 99.3 kg (219 lb). His oral temperature is 98.1 °F (36.7 °C). His blood pressure is 123/88 and his pulse is 70. His respiration is 20 and oxygen saturation is 100%.     Chief Complaint: Headache (Headache and all other symptoms started today. Took Tylenol this morning at 930 am), Cough, Sinus Problem (Sinus congestion started on [place]), Nasal Congestion, Generalized Body Aches, and Chills    CC as above. Not taking any medication for symptoms       Constitution: Negative.   HENT:  Positive for ear pain, congestion and sinus pain.    Neck: neck negative.   Cardiovascular: Negative.    Respiratory:  Positive for cough.    Psychiatric/Behavioral: Negative.       Objective:      Physical Exam   Constitutional: He is oriented to person, place, and time. He appears well-developed.   HENT:   Head: Normocephalic.   Ears:   Right Ear: Tympanic membrane normal.   Left Ear: Tympanic membrane normal.   Nose: Congestion present. Right sinus exhibits frontal sinus tenderness. Left sinus exhibits frontal sinus tenderness.   Mouth/Throat: Oropharynx is clear.   Eyes: Conjunctivae and EOM are normal. Pupils are equal, round, and reactive to light.   Neck: Neck supple.   Cardiovascular: Normal rate, regular rhythm and normal heart sounds.   Pulmonary/Chest: Effort normal and breath sounds normal.   Musculoskeletal: Normal range of motion.         General: Normal range of motion.   Neurological: He is alert and oriented to person, place, and time.   Skin: Skin is warm and dry.   Psychiatric: His behavior is normal.   Vitals reviewed.      Assessment:       1. Acute sinusitis, recurrence not specified, unspecified location    2. Flu-like symptoms    3. Encounter for screening for severe acute respiratory syndrome coronavirus 2 (SARS-CoV-2) infection    4. Cough, unspecified type              Office Visit on 12/29/2022 "   Component Date Value Ref Range Status    POC Rapid COVID 12/29/2022 Negative  Negative Final     Acceptable 12/29/2022 Yes   Final    Rapid Influenza A Ag 12/29/2022 Negative  Negative Final    Rapid Influenza B Ag 12/29/2022 Negative  Negative Final     Acceptable 12/29/2022 Yes   Final        No results found.   Plan:         Decadron 8mg IM in office.   Medication as ordered. May use humidifier.  If any shortness of breath, wheezing, continued fevers or any new symptoms then immediately go to ER.      Acute sinusitis, recurrence not specified, unspecified location  -     dexAMETHasone injection 8 mg  -     amoxicillin (AMOXIL) 875 MG tablet; Take 1 tablet (875 mg total) by mouth 2 (two) times daily. for 10 days  Dispense: 20 tablet; Refill: 0  -     fluticasone propionate (FLONASE) 50 mcg/actuation nasal spray; 1 spray (50 mcg total) by Each Nostril route once daily.  Dispense: 16 mL; Refill: 0  -     levocetirizine (XYZAL) 5 MG tablet; Take 1 tablet (5 mg total) by mouth every evening. for 14 days  Dispense: 14 tablet; Refill: 0    Flu-like symptoms  -     POCT Influenza A/B    Encounter for screening for severe acute respiratory syndrome coronavirus 2 (SARS-CoV-2) infection  -     POCT COVID-19 Rapid Screening    Cough, unspecified type  -     promethazine-dextromethorphan (PROMETHAZINE-DM) 6.25-15 mg/5 mL Syrp; Take 5 mLs by mouth every 6 (six) hours as needed (cough).  Dispense: 100 mL; Refill: 0

## 2023-02-09 ENCOUNTER — OFFICE VISIT (OUTPATIENT)
Dept: INTERNAL MEDICINE | Facility: CLINIC | Age: 43
End: 2023-02-09
Payer: MEDICAID

## 2023-02-09 VITALS
WEIGHT: 218 LBS | TEMPERATURE: 98 F | HEART RATE: 80 BPM | SYSTOLIC BLOOD PRESSURE: 114 MMHG | BODY MASS INDEX: 35.03 KG/M2 | HEIGHT: 66 IN | RESPIRATION RATE: 18 BRPM | DIASTOLIC BLOOD PRESSURE: 74 MMHG

## 2023-02-09 DIAGNOSIS — G56.92 NEUROPATHY, ARM, LEFT: Primary | ICD-10-CM

## 2023-02-09 DIAGNOSIS — M54.2 CERVICALGIA: ICD-10-CM

## 2023-02-09 DIAGNOSIS — R20.2 PARESTHESIA OF LEFT ARM: ICD-10-CM

## 2023-02-09 DIAGNOSIS — E78.1 HYPERTRIGLYCERIDEMIA: ICD-10-CM

## 2023-02-09 DIAGNOSIS — M50.30 DDD (DEGENERATIVE DISC DISEASE), CERVICAL: ICD-10-CM

## 2023-02-09 DIAGNOSIS — Z00.00 WELLNESS EXAMINATION: ICD-10-CM

## 2023-02-09 PROCEDURE — 3008F PR BODY MASS INDEX (BMI) DOCUMENTED: ICD-10-PCS | Mod: CPTII,,, | Performed by: NURSE PRACTITIONER

## 2023-02-09 PROCEDURE — 3074F SYST BP LT 130 MM HG: CPT | Mod: CPTII,,, | Performed by: NURSE PRACTITIONER

## 2023-02-09 PROCEDURE — 1159F MED LIST DOCD IN RCRD: CPT | Mod: CPTII,,, | Performed by: NURSE PRACTITIONER

## 2023-02-09 PROCEDURE — 99215 PR OFFICE/OUTPT VISIT, EST, LEVL V, 40-54 MIN: ICD-10-PCS | Mod: S$PBB,,, | Performed by: NURSE PRACTITIONER

## 2023-02-09 PROCEDURE — 3008F BODY MASS INDEX DOCD: CPT | Mod: CPTII,,, | Performed by: NURSE PRACTITIONER

## 2023-02-09 PROCEDURE — 1159F PR MEDICATION LIST DOCUMENTED IN MEDICAL RECORD: ICD-10-PCS | Mod: CPTII,,, | Performed by: NURSE PRACTITIONER

## 2023-02-09 PROCEDURE — 3078F PR MOST RECENT DIASTOLIC BLOOD PRESSURE < 80 MM HG: ICD-10-PCS | Mod: CPTII,,, | Performed by: NURSE PRACTITIONER

## 2023-02-09 PROCEDURE — 3074F PR MOST RECENT SYSTOLIC BLOOD PRESSURE < 130 MM HG: ICD-10-PCS | Mod: CPTII,,, | Performed by: NURSE PRACTITIONER

## 2023-02-09 PROCEDURE — 1160F PR REVIEW ALL MEDS BY PRESCRIBER/CLIN PHARMACIST DOCUMENTED: ICD-10-PCS | Mod: CPTII,,, | Performed by: NURSE PRACTITIONER

## 2023-02-09 PROCEDURE — 3078F DIAST BP <80 MM HG: CPT | Mod: CPTII,,, | Performed by: NURSE PRACTITIONER

## 2023-02-09 PROCEDURE — 99215 OFFICE O/P EST HI 40 MIN: CPT | Mod: PBBFAC | Performed by: NURSE PRACTITIONER

## 2023-02-09 PROCEDURE — 99215 OFFICE O/P EST HI 40 MIN: CPT | Mod: S$PBB,,, | Performed by: NURSE PRACTITIONER

## 2023-02-09 PROCEDURE — 1160F RVW MEDS BY RX/DR IN RCRD: CPT | Mod: CPTII,,, | Performed by: NURSE PRACTITIONER

## 2023-02-09 RX ORDER — CYCLOBENZAPRINE HCL 10 MG
10 TABLET ORAL 3 TIMES DAILY PRN
Qty: 30 TABLET | Refills: 1 | Status: SHIPPED | OUTPATIENT
Start: 2023-02-09 | End: 2023-08-06 | Stop reason: SDUPTHER

## 2023-02-09 NOTE — ASSESSMENT & PLAN NOTE
Educated on a low-fat, low-cholesterol diet and aerobic exercise (20-30 mins/day x 5 days a week). Encouraged healthy fruits and veggie intake. Increase water intake. Avoid excess ETOH intake and smoking

## 2023-02-09 NOTE — PROGRESS NOTES
"  EMMANUEL Hooper   [facility]  [address] [facility] [address]      PATIENT NAME: Edin Branch  : 1980  DATE: 23  MRN: [id number]      Billing Provider: EMMANUEL Hooper  Level of Service:   Patient PCP Information       None on File            Reason for Visit / Chief Complaint: Lab review and Follow-up       History of Present Illness / Problem Focused Workflow     Edin Branch presents to the clinic with Lab review and Follow-up     Initial Visit (2021): 41 y.o. male presenting to [facility] to establish primary care.   Previous PCP: None   PmHx: TBO, DDD neck and lumbar spine   SHx: Denies   FHx: Oral cancer (mother, maternal grandfather; both smokers), throat cancer (maternal grandmother), HTN, DM, Heart Attack (father at age of 35 and paternal grandfather--age at death unknown, but  in )   Complaints today: Establish primary care.   ED visit on 21 with c/o paresthesias to left arm and left leg. He underwent CT C-spine and L-spine consistent with degenerative disc disease. He denies any major injuries or accidents during his lifetime, but admits playing football from childhood through high school. He is currently taking Gabapentin and Prednisone. He received a Toradol and steroid injection in the ED. States pain somewhat improved, but he is still experiencing some discomfort in LLE with prolonged ambulation. Denies falls.   Pt was also diagnosed with HTN during ED visit and prescribed Lisinopril 5 mg daily. SBP was noted as high as 200s. Pt admits that he never obtained the Lisinopril as he attributed the elevated BP to pain. States "never had it (HTN) before...I don't like a lot of medicine." BP improved today. He denies fever, chills, HA, weakness, dizziness, chest pain, SOB, lower ext edema, or blurred vision.    (2022): Pt presenting for f/u to review results. All labs reviewed and unremarkable.   PSA " "WNL   HgA1c 5.5   CBC, CMP, Lipid, TSH unremarkable or WNL   HIV, Hepatitis, GC negative   Trace blood on UA. Seen on UA years ago. Denies gross hematuria. He does smoke cigarettes.     Bp at goal today. He is still not taking the Lisinopril. He continues with c/o neck and lower back pain radiating to LLE. Previously given a trial of Mobic as he reported nothing worked in the past. [facility] Mobic was not effective. He now has health insurance and is eager to go to P.T. States ready to get back to work. No other concerns.    (5/18/2022): Pt presenting for f/u to review results of CT A/P. He underwent CT on 4/8/22 to further evaluate microhematuria. Significant findings included:  "There is no pelvic free fluid. The abdominal aorta is normal in  caliber. There is moderate atherosclerotic disease which is advanced  for patient's age. No retroperitoneal adenopathy.      Mild degenerative change of the spine primarily at L5-S1. Small  fat-containing paraumbilical hernia.     Impression.  1. Mild bladder wall thickening probably accentuated by under  distention but cystitis is possible.  2. No urinary tract calculi or enhancing masses."  Patient is a smoker. He denies dysuria, overt blood in urine, or abnl urine color or odor.     (8/9/2022): Pt presenting for routine f/u. Lab results pending. Since last visit, he's been referred to Dr. Flores/[place] for PVD. States had one visit. Scheduled for calcium test, treadmill stress test, and LE US but had to reschedule them all due to COVID-19 infection 7/24/22. States most tests rescheduled for later this month. He's also been referred to [facility]ro for MSH. Appt 10/20/22. As previously stated, he visited the ED on 7/24/22 with c/o fever, CP, and jaw pain. TST revealed sinus tach, otherwise normal. CXR normal. He tested + for COVID. Overall, recovered. States still fatigued from time to time with occasional HA, but no chest pain, SOB, or LE swelling.      2/9/23: Pt presenting for routine " f/u. Labs completed this am unremarkable besides triglyerides 200s. States ate late last night. He has been evaluated in [facility] with calcium test, treadmill stress test, and LE US since last visit. States all tests unremarkable. Also s/p cysto in October that was negative. See below. Today, he is c/o left arm pain, starting at top of arm, and traveling to fingers. Pain shooting and associated with numbness and tingling. He has a h/o DDD C spine seen on a CT of [facility] in 2021. He was referred to P.T. for DDD of L spine and C spine in 2022. He attended an initial evaluation 3/2/22 and completed P.T. 7/4/2022. He related P.T. improved symptoms, but left arm pain became exacerbated about 3 weeks ago. He works in maintenance and drives trucks. Admits certain activities (ie holding steering wheel) exacerbate the discomfort. He relates cyclobenzaprine and Gabapentin minimally effective. He has been performing HEP shown to him by P.T. for the last 3 weeks with minimal relief in symptoms.       S/p cysto for [facility] 10/20/22:  Cystoscopy:  - Penis:  Circumcised, no lesions  - Urethral meatus:  No strictures  - Urethra:  Normal without strictures or lesions  - Prostate:  Not enlarged  - Bladder neck:  Normal  - Bladder:  No mucosal abnormalities   - Ureteral orifices:  On the trigone with clear efflux bilaterally     The patient tolerated the procedure well without complications.  He was given Cipro 500mg, one tablet in the clinic.   The urethra was anesthetized with 2% Lidocaine Jelly, Urojet.      Neck Pain   This is a chronic problem. The current episode started 1 to 4 weeks ago. The problem occurs constantly. The problem has been gradually worsening. The pain is associated with a sleep position. The quality of the pain is described as burning and shooting. The pain is at a severity of 10/10. The pain is severe. The symptoms are aggravated by position. The pain is Worse during the day. Associated symptoms include numbness and  tingling. Pertinent negatives include no chest pain, fever, headaches, leg pain, pain with swallowing, paresis, photophobia, syncope, trouble swallowing, visual change, weakness or weight loss. He has tried home exercises and muscle relaxants for the symptoms. The treatment provided mild relief.     Review of Systems     Review of Systems   Constitutional:  Negative for fever and weight loss.   HENT:  Negative for trouble swallowing.    Eyes:  Negative for photophobia.   Respiratory:  Negative for cough and shortness of breath.    Cardiovascular:  Negative for chest pain, palpitations, leg swelling and syncope.   Musculoskeletal:  Positive for neck pain.   Neurological:  Positive for tingling and numbness. Negative for weakness and headaches.   All other systems reviewed and are negative.    Medical / Social / Family History   History reviewed. No pertinent past medical history.    Past Surgical History:   Procedure Laterality Date    FINGER SURGERY         Social History    reports that he has been smoking cigarettes. He has been smoking an average of 1.5 packs per day. He has never used smokeless tobacco. He reports current alcohol use of about 6.0 standard drinks per week. He reports that he does not currently use drugs.    Family History  's family history includes Cancer in his mother; Diabetes in his brother and maternal grandmother; Heart attack in his father and paternal grandmother; Hypertension in his maternal grandmother; Stomach cancer in his maternal grandfather.    Medications and Allergies     Medications  Medication List with Changes/Refills   Current Medications    ASPIRIN/CAFFEINE (CECILIA BACK AND BODY ORAL)    Take by mouth.    GABAPENTIN (NEURONTIN) 300 MG CAPSULE    SMARTSI Capsule(s) By Mouth Every 12 Hours PRN   Changed and/or Refilled Medications    Modified Medication Previous Medication    CYCLOBENZAPRINE (FLEXERIL) 10 MG TABLET cyclobenzaprine (FLEXERIL) 10 MG tablet       Take 1  tablet (10 mg total) by mouth 3 (three) times daily as needed for Muscle spasms.    Take 10 mg by mouth 3 (three) times daily as needed for Muscle spasms.   Discontinued Medications    FLUTICASONE PROPIONATE (FLONASE) 50 MCG/ACTUATION NASAL SPRAY    1 spray (50 mcg total) by Each Nostril route once daily.    LEVOCETIRIZINE (XYZAL) 5 MG TABLET    Take 1 tablet (5 mg total) by mouth every evening. for 14 days    PROMETHAZINE-DEXTROMETHORPHAN (PROMETHAZINE-DM) 6.25-15 MG/5 ML SYRP    Take 5 mLs by mouth every 6 (six) hours as needed (cough).       Allergies  Review of patient's allergies indicates:  No Known Allergies    Physical Examination     Vitals:    02/09/23 0935   BP: 114/74   Pulse: 80   Resp: 18   Temp: 98.3 °F (36.8 °C)     Physical Exam  Constitutional:       Appearance: Normal appearance.   HENT:      Head: Normocephalic and atraumatic.   Eyes:      Extraocular Movements: Extraocular movements intact.   Cardiovascular:      Rate and Rhythm: Normal rate and regular rhythm.      Pulses: Normal pulses.   Pulmonary:      Effort: Pulmonary effort is normal.   Musculoskeletal:         General: Normal range of motion.      Left shoulder: No swelling, deformity, effusion, laceration, tenderness, bony tenderness or crepitus. Normal range of motion. Decreased strength. Normal pulse.      Right upper arm: Normal.      Left upper arm: Normal.      Cervical back: Normal range of motion. Pain with movement present.   Skin:     General: Skin is warm and dry.   Neurological:      General: No focal deficit present.      Mental Status: He is alert and oriented to person, place, and time.   Psychiatric:         Mood and Affect: Mood normal.         Behavior: Behavior normal.         Thought Content: Thought content normal.         Judgment: Judgment normal.         Results     Lab Results   Component Value Date    WBC 8.1 02/09/2023    RBC 5.15 02/09/2023    HGB 14.9 02/09/2023    HCT 44.4 02/09/2023    MCV 86.2 02/09/2023     MCH 28.9 02/09/2023    MCHC 33.6 02/09/2023    RDW 14.7 02/09/2023     02/09/2023    MPV 11.8 (H) 02/09/2023     CMP  Sodium Level   Date Value Ref Range Status   02/09/2023 138 136 - 145 mmol/L Final     Potassium Level   Date Value Ref Range Status   02/09/2023 4.2 3.5 - 5.1 mmol/L Final     Carbon Dioxide   Date Value Ref Range Status   02/09/2023 22 22 - 29 mmol/L Final     Blood Urea Nitrogen   Date Value Ref Range Status   02/09/2023 14.4 8.9 - 20.6 mg/dL Final     Creatinine   Date Value Ref Range Status   02/09/2023 1.05 0.73 - 1.18 mg/dL Final     Calcium Level Total   Date Value Ref Range Status   02/09/2023 9.2 8.4 - 10.2 mg/dL Final     Albumin Level   Date Value Ref Range Status   02/09/2023 4.2 3.5 - 5.0 g/dL Final     Bilirubin Total   Date Value Ref Range Status   02/09/2023 0.3 <=1.5 mg/dL Final     Alkaline Phosphatase   Date Value Ref Range Status   02/09/2023 69 40 - 150 unit/L Final     Aspartate Aminotransferase   Date Value Ref Range Status   02/09/2023 26 5 - 34 unit/L Final     Alanine Aminotransferase   Date Value Ref Range Status   02/09/2023 21 0 - 55 unit/L Final     Estimated GFR-Non    Date Value Ref Range Status   02/02/2022 85 >=90      Lab Results   Component Value Date    CHOL 178 02/09/2023     Lab Results   Component Value Date    HDL 55 02/09/2023     No results found for: LDLCALC  Lab Results   Component Value Date    TRIG 242 (H) 02/09/2023     No results found for: CHOLHDL  Lab Results   Component Value Date    TSH 1.021 02/09/2023     Lab Results   Component Value Date    PHUR 5.5 02/02/2022    PROTEINUA Negative 02/09/2023    GLUCUA Normal 02/02/2022    KETONESU Negative 02/02/2022    OCCULTUA Trace 02/02/2022    NITRITE Negative 02/02/2022    LEUKOCYTESUR 25 (A) 02/09/2023           Assessment and Plan (including Health Maintenance)     Plan:         Health Maintenance Due   Topic Date Due    COVID-19 Vaccine (1) Never done    Pneumococcal  Vaccines (Age 0-64) (1 - PCV) Never done    TETANUS VACCINE  Never done    High Dose Statin  Never done    Influenza Vaccine (1) Never done       Problem List Items Addressed This Visit          Neuro    DDD (degenerative disc disease), cervical    Current Assessment & Plan     Underwent P.T. 3/22-7/22 with improvement in sx, but pain exacerbated three weeks ago. HEP not effective  Uses Cyclobenzaprine and Gabapentin PRN--can continue for now  Will obtain MRI and EMG for further eval and refer as appropriate         Relevant Orders    MRI Cervical Spine Without Contrast    Neuropathy, arm, left - Primary    Current Assessment & Plan     Obtain EMG  Obtain wrist splint. Wear nightly and avoid repetitive flexion of wrist         Relevant Orders    Ambulatory referral/consult to Neurology       Cardiac/Vascular    Hypertriglyceridemia    Current Assessment & Plan     Educated on a low-fat, low-cholesterol diet and aerobic exercise (20-30 mins/day x 5 days a week). Encouraged healthy fruits and veggie intake. Increase water intake. Avoid excess ETOH intake and smoking           Relevant Orders    Comprehensive Metabolic Panel    Lipid Panel       Other    Wellness examination    Overview     Prostate cancer screening: PSA 0.91, 2/9/23          Other Visit Diagnoses       Cervicalgia        Relevant Orders    MRI Cervical Spine Without Contrast              Health Maintenance Topics with due status: Not Due       Topic Last Completion Date    Hemoglobin A1c (Diabetic Prevention Screening) 02/09/2023    Lipid Panel 02/09/2023       Future Appointments   Date Time Provider Department Center   4/20/2023  9:45 AM Kristine Oseguera DO [facility]re   8/9/2023  8:30 AM EMMANUEL Hooper [facility] MARLENA Aguirre      I spent a total of 47 minutes on the day of the visit.  This includes face to face time and non-face to face time preparing to see the patient (eg, review of tests), obtaining and/or reviewing separately  obtained history, documenting clinical information in the electronic or other health record, independently interpreting results and communicating results to the patient/family/caregiver, or care coordinator.        Signature:  EMMANUEL Hooper  [facility]  [address] [facility] [address]    Date of encounter: 2/9/23

## 2023-02-09 NOTE — ASSESSMENT & PLAN NOTE
Underwent P.T. 3/22-7/22 with improvement in sx, but pain exacerbated three weeks ago. HEP not effective  Uses Cyclobenzaprine and Gabapentin PRN--can continue for now  Will obtain MRI and EMG for further eval and refer as appropriate

## 2023-02-27 ENCOUNTER — HOSPITAL ENCOUNTER (OUTPATIENT)
Dept: RADIOLOGY | Facility: HOSPITAL | Age: 43
Discharge: HOME | End: 2023-02-27
Attending: NURSE PRACTITIONER
Payer: MEDICAID

## 2023-02-27 DIAGNOSIS — M54.2 CERVICALGIA: ICD-10-CM

## 2023-02-27 DIAGNOSIS — M50.30 DDD (DEGENERATIVE DISC DISEASE), CERVICAL: ICD-10-CM

## 2023-02-27 PROCEDURE — 72141 MRI NECK SPINE W/O DYE: CPT | Mod: TC

## 2023-02-28 ENCOUNTER — TELEPHONE (OUTPATIENT)
Dept: INTERNAL MEDICINE | Facility: CLINIC | Age: 43
End: 2023-02-28
Payer: MEDICAID

## 2023-02-28 DIAGNOSIS — M50.30 DDD (DEGENERATIVE DISC DISEASE), CERVICAL: Primary | ICD-10-CM

## 2023-02-28 NOTE — TELEPHONE ENCOUNTER
Please inform patient that C-spine MRI revealed some arthritic changes and protruding discs. I am referring to [facility].

## 2023-04-20 ENCOUNTER — OFFICE VISIT (OUTPATIENT)
Dept: UROLOGY | Facility: CLINIC | Age: 43
End: 2023-04-20
Payer: MEDICAID

## 2023-04-20 VITALS
HEIGHT: 66 IN | HEART RATE: 85 BPM | DIASTOLIC BLOOD PRESSURE: 89 MMHG | WEIGHT: 208.19 LBS | OXYGEN SATURATION: 100 % | TEMPERATURE: 98 F | BODY MASS INDEX: 33.46 KG/M2 | SYSTOLIC BLOOD PRESSURE: 130 MMHG

## 2023-04-20 DIAGNOSIS — R31.21 ASYMPTOMATIC MICROSCOPIC HEMATURIA: Primary | ICD-10-CM

## 2023-04-20 LAB
ESTROGEN SERPL-MCNC: NORMAL PG/ML
INSULIN SERPL-ACNC: NORMAL U[IU]/ML
LAB AP CLINICAL INFORMATION: NORMAL
LAB AP GROSS DESCRIPTION: NORMAL

## 2023-04-20 PROCEDURE — 99203 OFFICE O/P NEW LOW 30 MIN: CPT | Mod: S$PBB,,, | Performed by: UROLOGY

## 2023-04-20 PROCEDURE — 88108 CYTOPATH CONCENTRATE TECH: CPT | Mod: TC | Performed by: UROLOGY

## 2023-04-20 PROCEDURE — 99214 OFFICE O/P EST MOD 30 MIN: CPT | Mod: PBBFAC | Performed by: UROLOGY

## 2023-04-20 PROCEDURE — 99203 PR OFFICE/OUTPT VISIT, NEW, LEVL III, 30-44 MIN: ICD-10-PCS | Mod: S$PBB,,, | Performed by: UROLOGY

## 2023-04-20 NOTE — PROGRESS NOTES
Patient seen by Dr. RENEE Oseguera. Will return in 6 months. Written and verbal discharge instructions given.

## 2023-04-20 NOTE — PROGRESS NOTES
CC:  Six month    HPI:  Edin Branch is a 42 y.o. male seen for follow-up of microscopic hematuria.  He has history of microscopic hematuria.  Had a workup with a CT and cystoscopy which were negative.     ROS:  All systems reviewed and are negative except as documented in HPI and/or Assessment and Plan.     Patient Active Problem List:     Patient Active Problem List   Diagnosis    Hypertension    Obesity    Blindness of right eye    DDD (degenerative disc disease), lumbar    DDD (degenerative disc disease), cervical    Atherosclerosis of arteries    Microhematuria    History of COVID-19    Tobacco use    Wellness examination    Neuropathy, arm, left    Hypertriglyceridemia        Past Medical History:  Past Medical History:   Diagnosis Date    Rheumatoid arthritis, unspecified         Past Surgical History:  Past Surgical History:   Procedure Laterality Date    FINGER SURGERY          Family History:  Family History   Problem Relation Age of Onset    Cancer Mother     Heart attack Father     Diabetes Brother     Diabetes Maternal Grandmother     Hypertension Maternal Grandmother     Stomach cancer Maternal Grandfather     Heart attack Paternal Grandmother         Social History:  Social History     Socioeconomic History    Marital status: Single    Number of children: 4    Highest education level: 10th grade   Occupational History    Occupation: [employer]   Tobacco Use    Smoking status: Every Day     Packs/day: 1.50     Types: Cigarettes    Smokeless tobacco: Never   Substance and Sexual Activity    Alcohol use: Yes     Alcohol/week: 6.0 standard drinks     Types: 6 Cans of beer per week     Comment: 6 beers /weekend    Drug use: Not Currently    Sexual activity: Yes     Partners: Female        Allergies:  Review of patient's allergies indicates:  No Known Allergies     Objective:  Vitals:    04/20/23 0957   BP: 130/89   Pulse: 85   Temp: 98.4 °F (36.9 °C)     General:  Well developed, well nourished adult male  in no acute distress  Abdomen: Soft, nontender, no masses  Extremities:  No clubbing, cyanosis, or edema  Neurologic:  Grossly intact  Musculoskeletal:  Normal tone    Assessment:  1. Asymptomatic microscopic hematuria  - Cytology, Urine     Plan:  Urine cytology sent today.  Call with those results.  We will check another urinalysis and cytology in six months.  If that is negative we will go to yearly.    Follow-up:  Six months for urinalysis and cytology.    I spent a total of 21 minutes on the day of the visit.This includes face to face time and non-face to face time preparing to see the patient (eg, review of tests), obtaining and/or reviewing separately obtained history, documenting clinical information in the electronic or other health record, independently interpreting results and communicating results to the patient/family/caregiver, or care coordinator.

## 2023-04-21 ENCOUNTER — TELEPHONE (OUTPATIENT)
Dept: UROLOGY | Facility: CLINIC | Age: 43
End: 2023-04-21
Payer: MEDICAID

## 2023-04-21 ENCOUNTER — HOSPITAL ENCOUNTER (EMERGENCY)
Facility: HOSPITAL | Age: 43
Discharge: HOME | End: 2023-04-21
Attending: EMERGENCY MEDICINE
Payer: MEDICAID

## 2023-04-21 VITALS
DIASTOLIC BLOOD PRESSURE: 80 MMHG | BODY MASS INDEX: 30.62 KG/M2 | TEMPERATURE: 98 F | OXYGEN SATURATION: 100 % | WEIGHT: 213.88 LBS | HEART RATE: 90 BPM | HEIGHT: 70 IN | SYSTOLIC BLOOD PRESSURE: 136 MMHG | RESPIRATION RATE: 16 BRPM

## 2023-04-21 DIAGNOSIS — M50.30 BULGING OF CERVICAL INTERVERTEBRAL DISC: ICD-10-CM

## 2023-04-21 DIAGNOSIS — M54.12 LEFT CERVICAL RADICULOPATHY: Primary | ICD-10-CM

## 2023-04-21 PROCEDURE — 63600175 PHARM REV CODE 636 W HCPCS: Performed by: PHYSICIAN ASSISTANT

## 2023-04-21 PROCEDURE — 96372 THER/PROPH/DIAG INJ SC/IM: CPT | Performed by: PHYSICIAN ASSISTANT

## 2023-04-21 PROCEDURE — 99284 EMERGENCY DEPT VISIT MOD MDM: CPT

## 2023-04-21 RX ORDER — KETOROLAC TROMETHAMINE 30 MG/ML
30 INJECTION, SOLUTION INTRAMUSCULAR; INTRAVENOUS
Status: COMPLETED | OUTPATIENT
Start: 2023-04-21 | End: 2023-04-21

## 2023-04-21 RX ORDER — HYDROCODONE BITARTRATE AND ACETAMINOPHEN 7.5; 325 MG/1; MG/1
1 TABLET ORAL EVERY 6 HOURS PRN
Qty: 10 TABLET | Refills: 0 | Status: SHIPPED | OUTPATIENT
Start: 2023-04-21 | End: 2023-04-24

## 2023-04-21 RX ORDER — DEXAMETHASONE SODIUM PHOSPHATE 4 MG/ML
8 INJECTION, SOLUTION INTRA-ARTICULAR; INTRALESIONAL; INTRAMUSCULAR; INTRAVENOUS; SOFT TISSUE
Status: COMPLETED | OUTPATIENT
Start: 2023-04-21 | End: 2023-04-21

## 2023-04-21 RX ORDER — PREDNISONE 10 MG/1
10 TABLET ORAL DAILY
Qty: 21 TABLET | Refills: 0 | Status: SHIPPED | OUTPATIENT
Start: 2023-04-21 | End: 2023-08-09

## 2023-04-21 RX ADMIN — KETOROLAC TROMETHAMINE 30 MG: 30 INJECTION, SOLUTION INTRAMUSCULAR; INTRAVENOUS at 09:04

## 2023-04-21 RX ADMIN — DEXAMETHASONE SODIUM PHOSPHATE 8 MG: 4 INJECTION INTRA-ARTICULAR; INTRALESIONAL; INTRAMUSCULAR; INTRAVENOUS; SOFT TISSUE at 09:04

## 2023-04-21 NOTE — Clinical Note
"Edin Magana" Jose Carlos was seen and treated in our emergency department on 4/21/2023.  He may return to work on 04/24/2023.       If you have any questions or concerns, please don't hesitate to call.       RN    "

## 2023-04-21 NOTE — ED PROVIDER NOTES
Encounter Date: 4/21/2023       History     Chief Complaint   Patient presents with    Arm Injury     Pt reports nontraumatic left sided shoulder/arm pain x 6 days.      41 yo M w/ PMHx significant for RA, cervical/lumbar DDD, cervical radiculopathy of LUE, smoking, HTN & HLD presents to ED c/o 6 day hx of worsened pain in L neck radiating into L upper back & LUE w/ associated numbness & paresthesia in L hand. Patient reports this is consistent in nature w/ chronic symptoms, but worsened in severity. Denies any recent falls or other injury prior to worsening symptoms. Reports compliance w/ prescribed gabapentin, but is otherwise not taking anything for symptoms. Denies focal weakness, decreased  strength, HA, dizziness, neck rigidity, AMS, vision changes, photophobia, ataxia, abnormal balance, CP, SOB, palpitations, diaphoresis, syncope, abdominal pain, N/V, F/C, low back pain, incontinence, saddle anesthesia, bowel/bladder retention. VSS on arrival, patient in NAD.    Review of patient's allergies indicates:  No Known Allergies  Past Medical History:   Diagnosis Date    Rheumatoid arthritis, unspecified      Past Surgical History:   Procedure Laterality Date    FINGER SURGERY       Family History   Problem Relation Age of Onset    Cancer Mother     Heart attack Father     Diabetes Brother     Diabetes Maternal Grandmother     Hypertension Maternal Grandmother     Stomach cancer Maternal Grandfather     Heart attack Paternal Grandmother      Social History     Tobacco Use    Smoking status: Every Day     Packs/day: 1.50     Types: Cigarettes    Smokeless tobacco: Never   Substance Use Topics    Alcohol use: Yes     Alcohol/week: 6.0 standard drinks     Types: 6 Cans of beer per week     Comment: 6 beers /weekend    Drug use: Not Currently     Review of Systems   All other systems reviewed and are negative.    Physical Exam     Initial Vitals [04/21/23 0842]   BP Pulse Resp Temp SpO2   136/80 90 16 98.2 °F (36.8  °C) 100 %      MAP       --         Physical Exam    Nursing note and vitals reviewed.  Constitutional: He appears well-developed and well-nourished. He is not diaphoretic. No distress.   HENT:   Head: Normocephalic and atraumatic.   Eyes: Conjunctivae and EOM are normal. Pupils are equal, round, and reactive to light.   Neck: Neck supple. There are no signs of injury. No crepitus. No Brudzinski's sign and no Kernig's sign noted.   Cardiovascular:  Normal rate, regular rhythm, normal heart sounds and intact distal pulses.     Exam reveals no gallop and no friction rub.       No murmur heard.  Pulmonary/Chest: Breath sounds normal. No respiratory distress. He has no wheezes. He has no rhonchi. He has no rales.   Abdominal: Abdomen is soft. Bowel sounds are normal. He exhibits no distension, no abdominal bruit and no pulsatile midline mass. There is no abdominal tenderness. There is no rebound and no guarding.   Musculoskeletal:         General: No edema.      Right shoulder: Normal.      Left shoulder: Normal.      Right hand: Normal.      Left hand: Normal.      Cervical back: Neck supple. Spasms (L side paraspinal muscles) present. No swelling, edema, deformity, signs of trauma, rigidity, torticollis or crepitus. Pain with movement and muscular tenderness (L side paraspinal muscles) present. Decreased range of motion.      Thoracic back: Tenderness (TTP along medial border of L scapula) present. No swelling, edema, deformity, signs of trauma, spasms or bony tenderness. Normal range of motion. No scoliosis.      Lumbar back: Normal.     Neurological: He is alert and oriented to person, place, and time. He has normal strength. He is not disoriented. He displays no tremor. No cranial nerve deficit or sensory deficit. He exhibits normal muscle tone. He displays a negative Romberg sign. He displays no seizure activity. Coordination and gait normal. GCS eye subscore is 4. GCS verbal subscore is 5. GCS motor subscore is  6.   Skin: Skin is warm and dry. Capillary refill takes less than 2 seconds. No rash noted. No erythema. No pallor.   Psychiatric: He has a normal mood and affect.       ED Course   Procedures  Labs Reviewed - No data to display       Imaging Results    None          Medications   ketorolac injection 30 mg (30 mg Intramuscular Given 4/21/23 0943)   dexAMETHasone injection 8 mg (8 mg Intramuscular Given 4/21/23 0943)     Medical Decision Making:   Clinical Tests:   Lab Tests: Reviewed  Radiological Study: Reviewed  At today's visit patient has unremarkable exam w/o signs of spinal cord compression. Reviewed results from recent MRI. Patient reports he recently met w/ neurosurgery at [facility] & they told him the only option is surgery, but he would like a second opinion. Also reports receiving PT in past which helped alleviate symptoms. Encouraged patient to contact PCP today regarding possible re-referral to PT & also referral to another neurosurgeon for 2nd opinion. Given IM meds in ED & will discharge w/ meds for home. ED precautions given for new or worsening symptoms.                        Clinical Impression:   Final diagnoses:  [M54.12] Left cervical radiculopathy (Primary)  [M50.30] Bulging of cervical intervertebral disc        ED Disposition Condition    Discharge Good          ED Prescriptions       Medication Sig Dispense Start Date End Date Auth. Provider    predniSONE (DELTASONE) 10 MG tablet Take 1 tablet (10 mg total) by mouth once daily. Take 4 tabs x 3 days, then take 2 tabs x 3 days, then take 1 tab x 3 days. 21 tablet 4/21/2023 -- ANABELLA Mcgraw    HYDROcodone-acetaminophen (NORCO) 7.5-325 mg per tablet Take 1 tablet by mouth every 6 (six) hours as needed for Pain. 10 tablet 4/21/2023 4/24/2023 ANABELLA Mcgraw          Follow-up Information       Follow up With Specialties Details Why Contact Info    [facility] Call today  [address]  [address]  [phone]      [facility] Emergency Medicine  As needed, If symptoms worsen [address] [facility] [id number] [phone]             ANABELLA Mcgraw  04/21/23 0948       ANABELLA Mcgraw  04/21/23 0950

## 2023-04-21 NOTE — DISCHARGE INSTRUCTIONS
Report to Emergency Department if symptoms return or worsen; [facility] Clinic Within 1 to 2 days, It is important that you follow up with your primary care provider or specialist if indicated for further evaluation, workup, and treatment as necessary. The exam and treatment you received in Emergency Department was for an urgent problem and NOT INTENDED AS COMPLETE CARE. It is important that you FOLLOW UP with a doctor for ongoing care. If your symptoms become WORSE or you DO NOT IMPROVE and you are unable to reach your health care provider, you should RETURN to the Emergency Department. The Emergency Department provider has provided a PRELIMINARY INTERPRETATION of all your studies. A final interpretation may be done after you are discharged. If a change in your diagnosis or treatment is needed WE WILL CONTACT YOU. It is critical that we have a CURRENT PHONE NUMBER FOR YOU.

## 2023-04-21 NOTE — LETTER
"     [facility]  [address] [facility] [address]  Phone: [phone]  Fax: [phone]   April 21, 2023    Patient: Edin Branch (Paul)   YOB: 1980   Date of Visit: 4/21/2023   Patient ID [id number]       To Whom It May Concern:    Edin Branch (Paul) was seen and treated in our emergency department on 4/21/2023. He {Return to school/sport/work:60851}.      Sincerely,          ,       "

## 2023-05-15 PROBLEM — Z00.00 WELLNESS EXAMINATION: Status: RESOLVED | Noted: 2023-02-09 | Resolved: 2023-05-15

## 2023-07-07 ENCOUNTER — HOSPITAL ENCOUNTER (EMERGENCY)
Facility: HOSPITAL | Age: 43
Discharge: HOME | End: 2023-07-07
Attending: INTERNAL MEDICINE
Payer: MEDICAID

## 2023-07-07 VITALS
WEIGHT: 209.44 LBS | HEART RATE: 63 BPM | SYSTOLIC BLOOD PRESSURE: 123 MMHG | OXYGEN SATURATION: 100 % | DIASTOLIC BLOOD PRESSURE: 85 MMHG | RESPIRATION RATE: 18 BRPM | BODY MASS INDEX: 29.98 KG/M2 | HEIGHT: 70 IN | TEMPERATURE: 98 F

## 2023-07-07 DIAGNOSIS — R19.7 DIARRHEA, UNSPECIFIED TYPE: Primary | ICD-10-CM

## 2023-07-07 DIAGNOSIS — K52.9 GASTROENTERITIS: ICD-10-CM

## 2023-07-07 LAB
ALBUMIN SERPL-MCNC: 4.1 G/DL (ref 3.5–5)
ALBUMIN/GLOB SERPL: 1.5 RATIO (ref 1.1–2)
ALP SERPL-CCNC: 54 UNIT/L (ref 40–150)
ALT SERPL-CCNC: 18 UNIT/L (ref 0–55)
APPEARANCE UR: CLEAR
AST SERPL-CCNC: 18 UNIT/L (ref 5–34)
BACTERIA #/AREA URNS AUTO: ABNORMAL /HPF
BASOPHILS # BLD AUTO: 0.04 X10(3)/MCL
BASOPHILS NFR BLD AUTO: 0.5 %
BILIRUB UR QL STRIP.AUTO: NEGATIVE MG/DL
BILIRUBIN DIRECT+TOT PNL SERPL-MCNC: 0.6 MG/DL
BUN SERPL-MCNC: 10.3 MG/DL (ref 8.9–20.6)
CALCIUM SERPL-MCNC: 9.2 MG/DL (ref 8.4–10.2)
CHLORIDE SERPL-SCNC: 106 MMOL/L (ref 98–107)
CO2 SERPL-SCNC: 27 MMOL/L (ref 22–29)
COLOR UR: ABNORMAL
CREAT SERPL-MCNC: 1.07 MG/DL (ref 0.73–1.18)
EOSINOPHIL # BLD AUTO: 0.11 X10(3)/MCL (ref 0–0.9)
EOSINOPHIL NFR BLD AUTO: 1.5 %
ERYTHROCYTE [DISTWIDTH] IN BLOOD BY AUTOMATED COUNT: 15.2 % (ref 11.5–17)
GFR SERPLBLD CREATININE-BSD FMLA CKD-EPI: >60 MLS/MIN/1.73/M2
GLOBULIN SER-MCNC: 2.8 GM/DL (ref 2.4–3.5)
GLUCOSE SERPL-MCNC: 93 MG/DL (ref 74–100)
GLUCOSE UR QL STRIP.AUTO: NORMAL MG/DL
HCT VFR BLD AUTO: 44.9 % (ref 42–52)
HGB BLD-MCNC: 14.9 G/DL (ref 14–18)
HYALINE CASTS #/AREA URNS LPF: ABNORMAL /LPF
IMM GRANULOCYTES # BLD AUTO: 0.01 X10(3)/MCL (ref 0–0.04)
IMM GRANULOCYTES NFR BLD AUTO: 0.1 %
KETONES UR QL STRIP.AUTO: NEGATIVE MG/DL
LEUKOCYTE ESTERASE UR QL STRIP.AUTO: NEGATIVE UNIT/L
LIPASE SERPL-CCNC: 19 U/L
LYMPHOCYTES # BLD AUTO: 2.29 X10(3)/MCL (ref 0.6–4.6)
LYMPHOCYTES NFR BLD AUTO: 30.7 %
MCH RBC QN AUTO: 29.1 PG (ref 27–31)
MCHC RBC AUTO-ENTMCNC: 33.2 G/DL (ref 33–36)
MCV RBC AUTO: 87.7 FL (ref 80–94)
MONOCYTES # BLD AUTO: 0.46 X10(3)/MCL (ref 0.1–1.3)
MONOCYTES NFR BLD AUTO: 6.2 %
NEUTROPHILS # BLD AUTO: 4.56 X10(3)/MCL (ref 2.1–9.2)
NEUTROPHILS NFR BLD AUTO: 61 %
NITRITE UR QL STRIP.AUTO: NEGATIVE
NRBC BLD AUTO-RTO: 0 %
PH UR STRIP.AUTO: 7 [PH]
PLATELET # BLD AUTO: 166 X10(3)/MCL (ref 130–400)
PMV BLD AUTO: 11.5 FL (ref 7.4–10.4)
POTASSIUM SERPL-SCNC: 4.3 MMOL/L (ref 3.5–5.1)
PROT SERPL-MCNC: 6.9 GM/DL (ref 6.4–8.3)
PROT UR QL STRIP.AUTO: NEGATIVE MG/DL
RBC # BLD AUTO: 5.12 X10(6)/MCL (ref 4.7–6.1)
RBC #/AREA URNS AUTO: ABNORMAL /HPF
RBC UR QL AUTO: NEGATIVE UNIT/L
SODIUM SERPL-SCNC: 141 MMOL/L (ref 136–145)
SP GR UR STRIP.AUTO: 1.02
SQUAMOUS #/AREA URNS LPF: ABNORMAL /HPF
TROPONIN I SERPL-MCNC: <0.01 NG/ML (ref 0–0.04)
UROBILINOGEN UR STRIP-ACNC: NORMAL MG/DL
WBC # SPEC AUTO: 7.47 X10(3)/MCL (ref 4.5–11.5)
WBC #/AREA URNS AUTO: ABNORMAL /HPF

## 2023-07-07 PROCEDURE — 84484 ASSAY OF TROPONIN QUANT: CPT | Performed by: PHYSICIAN ASSISTANT

## 2023-07-07 PROCEDURE — 85025 COMPLETE CBC W/AUTO DIFF WBC: CPT | Performed by: PHYSICIAN ASSISTANT

## 2023-07-07 PROCEDURE — 25500020 PHARM REV CODE 255: Performed by: PHYSICIAN ASSISTANT

## 2023-07-07 PROCEDURE — 83690 ASSAY OF LIPASE: CPT | Performed by: PHYSICIAN ASSISTANT

## 2023-07-07 PROCEDURE — 25000003 PHARM REV CODE 250: Performed by: PHYSICIAN ASSISTANT

## 2023-07-07 PROCEDURE — 93005 ELECTROCARDIOGRAM TRACING: CPT

## 2023-07-07 PROCEDURE — 81001 URINALYSIS AUTO W/SCOPE: CPT | Performed by: PHYSICIAN ASSISTANT

## 2023-07-07 PROCEDURE — 96360 HYDRATION IV INFUSION INIT: CPT | Mod: 59

## 2023-07-07 PROCEDURE — 80053 COMPREHEN METABOLIC PANEL: CPT | Performed by: PHYSICIAN ASSISTANT

## 2023-07-07 PROCEDURE — 99285 EMERGENCY DEPT VISIT HI MDM: CPT | Mod: 25

## 2023-07-07 RX ORDER — LOPERAMIDE HYDROCHLORIDE 2 MG/1
2 CAPSULE ORAL 3 TIMES DAILY
Qty: 9 CAPSULE | Refills: 0 | Status: SHIPPED | OUTPATIENT
Start: 2023-07-07 | End: 2023-07-10

## 2023-07-07 RX ORDER — LOPERAMIDE HYDROCHLORIDE 2 MG/1
4 CAPSULE ORAL
Status: COMPLETED | OUTPATIENT
Start: 2023-07-07 | End: 2023-07-07

## 2023-07-07 RX ORDER — ONDANSETRON 4 MG/1
4 TABLET, ORALLY DISINTEGRATING ORAL EVERY 8 HOURS PRN
Qty: 9 TABLET | Refills: 0 | Status: SHIPPED | OUTPATIENT
Start: 2023-07-07 | End: 2023-07-10

## 2023-07-07 RX ADMIN — SODIUM CHLORIDE 1000 ML: 9 INJECTION, SOLUTION INTRAVENOUS at 01:07

## 2023-07-07 RX ADMIN — LOPERAMIDE HYDROCHLORIDE 4 MG: 2 CAPSULE ORAL at 01:07

## 2023-07-07 RX ADMIN — IOHEXOL 100 ML: 350 INJECTION, SOLUTION INTRAVENOUS at 03:07

## 2023-07-07 NOTE — Clinical Note
"Edin Magana" Jose Carlos was seen and treated in our emergency department on 7/7/2023.  He may return to work on 07/08/2023.       If you have any questions or concerns, please don't hesitate to call.       RN    "

## 2023-07-07 NOTE — ED PROVIDER NOTES
"Encounter Date: 7/7/2023       History     Chief Complaint   Patient presents with    Diarrhea    Weakness     C/o weakness and diarrhea since wed.      Patient reports to the ER with complaints of generalized weakness and diarrhea for the past x3 days; pt reports whenever he eats a solid "ot goes straight through me"; pt reports x2 episodes of vomiting over this time period; pt denies sick contacts or poorly prepared food    The history is provided by the patient.   Diarrhea   This is a new problem. The current episode started several days ago. Associated symptoms include vomiting. Pertinent negatives include no chills, fever, headaches, increased  flatus, myalgias, sweats or weight loss. There are no known risk factors.   Review of patient's allergies indicates:  No Known Allergies  Past Medical History:   Diagnosis Date    Rheumatoid arthritis, unspecified      Past Surgical History:   Procedure Laterality Date    FINGER SURGERY       Family History   Problem Relation Age of Onset    Cancer Mother     Heart attack Father     Diabetes Brother     Diabetes Maternal Grandmother     Hypertension Maternal Grandmother     Stomach cancer Maternal Grandfather     Heart attack Paternal Grandmother      Social History     Tobacco Use    Smoking status: Every Day     Packs/day: 1.50     Types: Cigarettes    Smokeless tobacco: Never   Substance Use Topics    Alcohol use: Yes     Alcohol/week: 6.0 standard drinks     Types: 6 Cans of beer per week     Comment: 6 beers /weekend    Drug use: Not Currently     Review of Systems   Constitutional:  Negative for chills, fever and weight loss.   HENT:  Negative for sore throat.    Respiratory:  Negative for shortness of breath.    Cardiovascular:  Negative for chest pain.   Gastrointestinal:  Positive for diarrhea and vomiting. Negative for flatus and nausea.   Genitourinary:  Negative for dysuria.   Musculoskeletal:  Negative for back pain and myalgias.   Skin:  Negative for rash. "   Neurological:  Negative for weakness and headaches.   Hematological:  Does not bruise/bleed easily.   Psychiatric/Behavioral: Negative.       Physical Exam     Initial Vitals [07/07/23 1224]   BP Pulse Resp Temp SpO2   120/81 65 20 97.9 °F (36.6 °C) 100 %      MAP       --         Physical Exam    Vitals reviewed.  Constitutional: He appears well-developed and well-nourished.   HENT:   Head: Normocephalic and atraumatic.   Eyes: Conjunctivae and EOM are normal. Pupils are equal, round, and reactive to light.   Neck:   Normal range of motion.  Cardiovascular:  Normal rate, regular rhythm, normal heart sounds and intact distal pulses.           Pulmonary/Chest: Breath sounds normal. He exhibits no tenderness.   Abdominal: Abdomen is soft. Bowel sounds are normal. He exhibits no distension. There is generalized abdominal tenderness.   Musculoskeletal:         General: Normal range of motion.      Cervical back: Normal range of motion.     Neurological: He is alert and oriented to person, place, and time. He displays normal reflexes. No cranial nerve deficit or sensory deficit. GCS score is 15. GCS eye subscore is 4. GCS verbal subscore is 5. GCS motor subscore is 6.   Skin: Skin is warm. No pallor.   Psychiatric: He has a normal mood and affect. His behavior is normal. Judgment and thought content normal.       ED Course   Procedures  Labs Reviewed   URINALYSIS, REFLEX TO URINE CULTURE - Abnormal; Notable for the following components:       Result Value    Bacteria, UA Trace (*)     Squamous Epithelial Cells, UA Trace (*)     All other components within normal limits   CBC WITH DIFFERENTIAL - Abnormal; Notable for the following components:    MPV 11.5 (*)     All other components within normal limits   TROPONIN I - Normal   LIPASE - Normal   CBC W/ AUTO DIFFERENTIAL    Narrative:     The following orders were created for panel order CBC auto differential.  Procedure                               Abnormality          Status                     ---------                               -----------         ------                     CBC with Differential[[id number]]        Abnormal            Final result                 Please view results for these tests on the individual orders.   COMPREHENSIVE METABOLIC PANEL   EXTRA TUBES    Narrative:     The following orders were created for panel order EXTRA TUBES.  Procedure                               Abnormality         Status                     ---------                               -----------         ------                     Light Blue Top Hold[[id number]]                                                         Gold Top Hold[[id number]]                                                                 Please view results for these tests on the individual orders.   LIGHT BLUE TOP HOLD   GOLD TOP HOLD     EKG Readings: (Independently Interpreted)   Initial Reading: No STEMI. Rhythm: Normal Sinus Rhythm. Heart Rate: 60. Ectopy: No Ectopy. Conduction: Normal. ST Segments: Normal ST Segments. T Waves: Normal. Clinical Impression: Normal Sinus Rhythm     Imaging Results              CT Abdomen Pelvis With Contrast (Final result)  Result time 07/07/23 16:04:41      Final result by Timothy Hair MD (07/07/23 16:04:41)                   Impression:      No abnormality seen      Electronically signed by: Timothy Hair  Date:    07/07/2023  Time:    16:04               Narrative:    EXAMINATION:  CT ABDOMEN PELVIS WITH CONTRAST    CLINICAL HISTORY:  Nausea/vomiting;nausea, vomiting, diarrhea x3 days;    TECHNIQUE:  Low dose axial images, sagittal and coronal reformations were obtained from the lung bases to the pubic symphysis following the IV administration of contrast. Automatic exposure control (AEC) is utilized to reduce patient radiation exposure.    COMPARISON:  04/08/2022    FINDINGS:  The lung bases are clear.    The liver appears normal.  No liver mass or lesion is seen.   Portal and hepatic veins appear normal.    The gallbladder appears normal.  No obvious gallstones are seen.  No biliary dilatation is seen.  No pericholecystic fluid is seen.    The pancreas appears normal.  No pancreatic mass or lesion is seen.    The spleen shows no acute abnormality.    The adrenal glands appear normal.  No adrenal nodule is seen.    The kidneys appear normal.  No hydronephrosis is seen.  No hydroureter is seen.  No nephrolithiasis is seen.  No obvious ureteral stones are seen.    Urinary bladder appears grossly unremarkable.    No colitis is seen.  No diverticulitis is seen.  No obvious colonic mass or lesion is seen.  The appendix appears normal.    No free air is seen.  No free fluid is seen.                                       Medications   iohexoL (OMNIPAQUE 350) 350 mg iodine/mL injection (has no administration in time range)   sodium chloride 0.9% bolus 1,000 mL 1,000 mL (0 mLs Intravenous Stopped 7/7/23 1418)   loperamide capsule 4 mg (4 mg Oral Given 7/7/23 1310)   iohexoL (OMNIPAQUE 350) injection 100 mL (100 mLs Intravenous Given 7/7/23 1546)     Medical Decision Making:   Differential Diagnosis:   Colitis vs viral GI vs diveticulitis vs pancreatitis    Given strict ED return precautions. I have spoken with the patient and/or caregivers. I have explained the patient's condition, diagnoses and treatment plan based on the information available to me at this time. I have answered the patient's and/or caregiver's questions and addressed any concerns. The patient and/or caregivers have as good an understanding of the patient's diagnosis, condition and treatment plan as can be expected at this point. The vital signs have been stable. The patient's condition is stable and appropriate for discharge from the emergency department.      The patient will pursue further outpatient evaluation with the primary care physician or other designated or consulting physician as outlined in the discharge  instructions. The patient and/or caregivers are agreeable to this plan of care and follow-up instructions have been explained in detail. The patient and/or caregivers have received these instructions in written format and have expressed an understanding of the discharge instructions. The patient and/or caregivers are aware that any significant change in condition or worsening of symptoms should prompt an immediate return to this or the closest emergency department or a call to 911.                          Clinical Impression:   Final diagnoses:  [R19.7] Diarrhea, unspecified type (Primary)  [K52.9] Gastroenteritis        ED Disposition Condition    Discharge Stable          ED Prescriptions       Medication Sig Dispense Start Date End Date Auth. Provider    ondansetron (ZOFRAN-ODT) 4 MG TbDL Take 1 tablet (4 mg total) by mouth every 8 (eight) hours as needed (nausea). 9 tablet 7/7/2023 7/10/2023 ANABELLA Whittaker    loperamide (IMODIUM) 2 mg capsule Take 1 capsule (2 mg total) by mouth 3 (three) times daily. for 3 days 9 capsule 7/7/2023 7/10/2023 ANABELLA Whittaker          Follow-up Information       Follow up With Specialties Details Why Contact Info    [facility]   [address] [address]  [phone]      discharge followup    If your symptoms become WORSE or you DO NOT IMPROVE and you are unable to reach your health care provider, you should RETURN to the emergency department    discharge info    Discussed all pertinent ED information, results, diagnosis and treatment plan; All questions and concerns were addressed at this time. Patient voices understanding of information and instructions. Patient is comfortable with plan and discharge             ANABELLA Whittaker  07/07/23 9853

## 2023-07-26 ENCOUNTER — HOSPITAL ENCOUNTER (EMERGENCY)
Facility: HOSPITAL | Age: 43
Discharge: HOME | End: 2023-07-26
Attending: EMERGENCY MEDICINE
Payer: OTHER MISCELLANEOUS

## 2023-07-26 VITALS
SYSTOLIC BLOOD PRESSURE: 151 MMHG | OXYGEN SATURATION: 99 % | WEIGHT: 211.63 LBS | HEART RATE: 80 BPM | DIASTOLIC BLOOD PRESSURE: 88 MMHG | BODY MASS INDEX: 35.26 KG/M2 | RESPIRATION RATE: 16 BRPM | TEMPERATURE: 99 F | HEIGHT: 65 IN

## 2023-07-26 DIAGNOSIS — T63.483A: Primary | ICD-10-CM

## 2023-07-26 PROCEDURE — 99281 EMR DPT VST MAYX REQ PHY/QHP: CPT

## 2023-07-26 NOTE — Clinical Note
"Edin Magana" Jose Carlos was seen and treated in our emergency department on 7/26/2023.  He may return to work on 07/27/2023.       If you have any questions or concerns, please don't hesitate to call.      MARCIANO Fajardo RN    "

## 2023-07-26 NOTE — ED PROVIDER NOTES
ED PROVIDER NOTE  7/26/2023    CHIEF COMPLAINT:   Chief Complaint   Patient presents with    Insect Bite     Reports wasp sting to the right eye. Right supraorbital swelling. Took 2 OTC Benadryl 1 hour prior.       HISTORY OF PRESENT ILLNESS:   Edin Branch is a 42 y.o. male who presents with chief complaint Wasp sting.  Onset was about an hour prior to arrival when he states that a wasp stung him in the right upper eyelid.  He reports having some swelling to his eyelid along with some burning stinging discomfort to the eyelid but no pain in the eye or vision changes.  Denies shortness of breath, wheezing, chest tightness, rash, nausea, vomiting, diarrhea.    The history is provided by the patient.       REVIEW OF SYSTEMS: as noted in the HPI.  NURSING NOTES REVIEWED      PAST MEDICAL/SURGICAL HISTORY:   Past Medical History:   Diagnosis Date    Rheumatoid arthritis, unspecified       Past Surgical History:   Procedure Laterality Date    FINGER SURGERY         FAMILY HISTORY:   Family History   Problem Relation Age of Onset    Cancer Mother     Heart attack Father     Diabetes Brother     Diabetes Maternal Grandmother     Hypertension Maternal Grandmother     Stomach cancer Maternal Grandfather     Heart attack Paternal Grandmother        SOCIAL HISTORY:   Social History     Tobacco Use    Smoking status: Every Day     Packs/day: 0.50     Types: Cigarettes    Smokeless tobacco: Never   Substance Use Topics    Alcohol use: Yes     Alcohol/week: 6.0 standard drinks     Types: 6 Cans of beer per week     Comment: 6 beers /weekend    Drug use: Not Currently       ALLERGIES: Review of patient's allergies indicates:  No Known Allergies    PHYSICAL EXAM:  Initial Vitals [07/26/23 1519]   BP Pulse Resp Temp SpO2   (!) 151/88 80 16 98.7 °F (37.1 °C) 99 %      MAP       --         Physical Exam    Nursing note and vitals reviewed.  Constitutional: He appears well-developed and well-nourished. No distress.   HENT:   Head:  Normocephalic and atraumatic.   Nose: Nose normal.   Mouth/Throat: Oropharynx is clear and moist and mucous membranes are normal.   Eyes: Conjunctivae and EOM are normal. Pupils are equal, round, and reactive to light.   Mild edema to right upper eyelid, no visible foreign body.   Neck: Neck supple. No tracheal deviation present.   Cardiovascular:  Normal rate, regular rhythm, normal heart sounds, intact distal pulses and normal pulses.           Pulmonary/Chest: Effort normal and breath sounds normal. No respiratory distress.   Abdominal: Abdomen is soft. There is no abdominal tenderness. There is no rebound and no guarding.   Musculoskeletal:         General: Normal range of motion.      Cervical back: Neck supple.     Neurological: He is alert and oriented to person, place, and time. GCS eye subscore is 4. GCS verbal subscore is 5. GCS motor subscore is 6.   CN II-XII intact. Moves all extremities. No gross sensory or motor deficits.   Skin: Skin is warm, dry and intact.   Psychiatric: He has a normal mood and affect. His speech is normal and behavior is normal. Judgment and thought content normal. Cognition and memory are normal.       RESULTS:  Labs Reviewed - No data to display  Imaging Results    None         PROCEDURES:  Procedures    ECG:       ED COURSE AND MEDICAL DECISION MAKING:  Medications - No data to display        Medical Decision Making  Well-appearing 42-year-old male presents after being stung right upper eyelid by a wasp about an hour prior to arrival.  He does have some localized swelling to the eyelid, denies any pain in his eye or vision changes.  Do not see any foreign body in the eyelid, low likelihood for stinger being left in the skin since he was stung by a wasp and not to bee.  No other signs or symptoms to suggest anaphylaxis.  Discussed local therapy to help with swelling and burning discomfort.  Given strict ED return precautions. I have spoken with the patient and/or caregivers. I  have explained the patient's condition, diagnoses and treatment plan based on the information available to me at this time. I have answered the patient's and/or caregiver's questions and addressed any concerns. The patient and/or caregivers have as good an understanding of the patient's diagnosis, condition and treatment plan as can be expected at this point. The vital signs have been stable. The patient's condition is stable and appropriate for discharge from the emergency department.     The patient will pursue further outpatient evaluation with the primary care physician or other designated or consulting physician as outlined in the discharge instructions. The patient and/or caregivers are agreeable to this plan of care and follow-up instructions have been explained in detail. The patient and/or caregivers have received these instructions in written format and have expressed an understanding of the discharge instructions. The patient and/or caregivers are aware that any significant change in condition or worsening of symptoms should prompt an immediate return to this or the closest emergency department or a call to 911.    Risk  OTC drugs.        CLINICAL IMPRESSION:  1. Local reaction to insect sting, assault, initial encounter        DISPOSITION:   ED Disposition Condition    Discharge Stable            ED Prescriptions    None       Follow-up Information       Follow up With Specialties Details Why Contact Info    [facility] Emergency Medicine  If symptoms worsen [address] [facility] [address] [phone]               Pipo Nieto,   07/26/23 1526

## 2023-08-06 ENCOUNTER — HOSPITAL ENCOUNTER (EMERGENCY)
Facility: HOSPITAL | Age: 43
Discharge: HOME | End: 2023-08-06
Attending: EMERGENCY MEDICINE
Payer: MEDICAID

## 2023-08-06 VITALS
RESPIRATION RATE: 17 BRPM | OXYGEN SATURATION: 98 % | WEIGHT: 210 LBS | HEIGHT: 71 IN | DIASTOLIC BLOOD PRESSURE: 116 MMHG | BODY MASS INDEX: 29.4 KG/M2 | SYSTOLIC BLOOD PRESSURE: 167 MMHG | TEMPERATURE: 98 F | HEART RATE: 97 BPM

## 2023-08-06 DIAGNOSIS — R07.81 RIB PAIN: ICD-10-CM

## 2023-08-06 DIAGNOSIS — S20.212A RIB CONTUSION, LEFT, INITIAL ENCOUNTER: Primary | ICD-10-CM

## 2023-08-06 PROCEDURE — 96372 THER/PROPH/DIAG INJ SC/IM: CPT | Performed by: EMERGENCY MEDICINE

## 2023-08-06 PROCEDURE — 99284 EMERGENCY DEPT VISIT MOD MDM: CPT | Mod: 25

## 2023-08-06 PROCEDURE — 63600175 PHARM REV CODE 636 W HCPCS: Performed by: EMERGENCY MEDICINE

## 2023-08-06 RX ORDER — CYCLOBENZAPRINE HCL 10 MG
10 TABLET ORAL 3 TIMES DAILY PRN
Qty: 30 TABLET | Refills: 1 | Status: SHIPPED | OUTPATIENT
Start: 2023-08-06

## 2023-08-06 RX ORDER — KETOROLAC TROMETHAMINE 30 MG/ML
15 INJECTION, SOLUTION INTRAMUSCULAR; INTRAVENOUS
Status: COMPLETED | OUTPATIENT
Start: 2023-08-06 | End: 2023-08-06

## 2023-08-06 RX ORDER — KETOROLAC TROMETHAMINE 30 MG/ML
INJECTION, SOLUTION INTRAMUSCULAR; INTRAVENOUS
Status: DISPENSED
Start: 2023-08-06 | End: 2023-08-06

## 2023-08-06 RX ORDER — IBUPROFEN 800 MG/1
800 TABLET ORAL EVERY 8 HOURS PRN
Qty: 20 TABLET | Refills: 0 | Status: SHIPPED | OUTPATIENT
Start: 2023-08-06

## 2023-08-06 RX ADMIN — KETOROLAC TROMETHAMINE 15 MG: 30 INJECTION, SOLUTION INTRAMUSCULAR; INTRAVENOUS at 01:08

## 2023-08-06 NOTE — Clinical Note
"Edin Magana" Jose Carlos was seen and treated in our emergency department on 8/6/2023.  He may return to work on 08/07/2023.       If you have any questions or concerns, please don't hesitate to call.      Chilo RAMON    "

## 2023-08-06 NOTE — ED PROVIDER NOTES
ED PROVIDER NOTE  8/6/2023    CHIEF COMPLAINT:   Chief Complaint   Patient presents with    Rib Injury     States left ribs possible broken.  Refuses to give any further explanation.  Smells of ETOH in Triage.         HISTORY OF PRESENT ILLNESS:   Edin Branch is a 42 y.o. male who presents with chief complaint Rib pain.  Onset was just prior to arrival when he was wrestling with a family member and states that they had squeezed him very tight and he feels like they broke whenever his ribs.  He reports having constant severe pain in his left lower ribs aggravated with movement and breathing, states pain gets so severe that he passes out.  Denies striking his head.  His family member states that he would picked him up and squeezed him real tight but then had set him back down and he did not fall completely to the ground.    The history is provided by the patient and a relative.         REVIEW OF SYSTEMS: as noted in the HPI.  NURSING NOTES REVIEWED      PAST MEDICAL/SURGICAL HISTORY:   Past Medical History:   Diagnosis Date    Rheumatoid arthritis, unspecified       Past Surgical History:   Procedure Laterality Date    FINGER SURGERY         FAMILY HISTORY:   Family History   Problem Relation Age of Onset    Cancer Mother     Heart attack Father     Diabetes Brother     Diabetes Maternal Grandmother     Hypertension Maternal Grandmother     Stomach cancer Maternal Grandfather     Heart attack Paternal Grandmother        SOCIAL HISTORY:   Social History     Tobacco Use    Smoking status: Every Day     Current packs/day: 0.50     Types: Cigarettes    Smokeless tobacco: Never   Substance Use Topics    Alcohol use: Yes     Alcohol/week: 6.0 standard drinks of alcohol     Types: 6 Cans of beer per week     Comment: 6 beers /weekend    Drug use: Not Currently       ALLERGIES: Review of patient's allergies indicates:  No Known Allergies    PHYSICAL EXAM:  Initial Vitals [08/06/23 0116]   BP Pulse Resp Temp SpO2   (!)  167/116 97 17 98.4 °F (36.9 °C) 98 %      MAP       --         Physical Exam    Nursing note and vitals reviewed.  Constitutional: He appears well-developed and well-nourished. No distress.   HENT:   Head: Normocephalic and atraumatic.   Nose: Nose normal.   Mouth/Throat: Oropharynx is clear and moist and mucous membranes are normal.   Eyes: Conjunctivae and EOM are normal. Pupils are equal, round, and reactive to light.   Neck: Neck supple. No tracheal deviation present.   Cardiovascular:  Normal rate, regular rhythm, normal heart sounds, intact distal pulses and normal pulses.           Pulmonary/Chest: Effort normal and breath sounds normal. No respiratory distress. He exhibits tenderness. He exhibits no crepitus.     Abdominal: Abdomen is soft. There is no abdominal tenderness. There is no rebound and no guarding.   Musculoskeletal:         General: Normal range of motion.      Cervical back: Neck supple. No bony tenderness.      Thoracic back: No bony tenderness.      Lumbar back: No bony tenderness.     Neurological: He is alert and oriented to person, place, and time. GCS eye subscore is 4. GCS verbal subscore is 5. GCS motor subscore is 6.   CN II-XII intact. Moves all extremities. No gross sensory or motor deficits.   Skin: Skin is warm, dry and intact.   Psychiatric: He has a normal mood and affect. His speech is normal and behavior is normal. Judgment and thought content normal. Cognition and memory are normal.         RESULTS:  Labs Reviewed - No data to display  Imaging Results              X-Ray Chest AP Portable (Final result)  Result time 08/06/23 10:47:36      Final result by Nathan Ibanez MD (08/06/23 10:47:36)                   Impression:      No acute pulmonary process identified.      Electronically signed by: Nathan Ibanez  Date:    08/06/2023  Time:    10:47               Narrative:    EXAMINATION:  XR CHEST AP PORTABLE    CLINICAL HISTORY:  Pleurodynia    TECHNIQUE:  Frontal view(s) of  the chest.    COMPARISON:  Radiography 07/24/2022    FINDINGS:  Normal cardiac silhouette.  The lungs are well-inflated.  No consolidation identified.  No significant pleural effusion or discernible pneumothorax.                        Wet Read by Pipo Nieto DO (08/06/23 01:47:04, [facility], [facility] Medicine)    Normal cardiomediastinal silhouette.  No dense lobar consolidation or pneumothorax.  No obvious displaced rib fractures.                                    PROCEDURES:  Procedures    ECG:       ED COURSE AND MEDICAL DECISION MAKING:  Medications   ketorolac injection 15 mg (15 mg Intramuscular Given 8/6/23 0147)           Medical Decision Making  Well-appearing 42-year-old male who presents with left-sided rib pain after wrestling with a family member and they had squeezed him very tight any feels like something popped, reporting severe pain especially when he turns, states he passed out because the pain was so severe but his family caught him and he did not fall to the ground and strike his head.  He is maintaining normal oxygen saturation on room air.  Chest x-ray shows no acute abnormality.  Low suspicion for any significant chest trauma as he does not have any crepitus or obvious rib deformity and given the unremarkable x-ray do not feel any further workup is indicated.  We will treat symptomatically with ibuprofen and Flexeril.  Given strict ED return precautions. I have spoken with the patient and/or caregivers. I have explained the patient's condition, diagnoses and treatment plan based on the information available to me at this time. I have answered the patient's and/or caregiver's questions and addressed any concerns. The patient and/or caregivers have as good an understanding of the patient's diagnosis, condition and treatment plan as can be expected at this point. The vital signs have been stable. The patient's condition is stable and appropriate for discharge from  the emergency department.     The patient will pursue further outpatient evaluation with the primary care physician or other designated or consulting physician as outlined in the discharge instructions. The patient and/or caregivers are agreeable to this plan of care and follow-up instructions have been explained in detail. The patient and/or caregivers have received these instructions in written format and have expressed an understanding of the discharge instructions. The patient and/or caregivers are aware that any significant change in condition or worsening of symptoms should prompt an immediate return to this or the closest emergency department or a call to 911.    Problems Addressed:  Rib pain: complicated acute illness or injury     Details: Differential diagnosis includes rib contusion, rib fracture, pneumothorax.    Amount and/or Complexity of Data Reviewed  Radiology: ordered and independent interpretation performed. Decision-making details documented in ED Course.    Risk  Prescription drug management.        CLINICAL IMPRESSION:  1. Rib contusion, left, initial encounter    2. Rib pain        DISPOSITION:   ED Disposition Condition    Discharge Stable            ED Prescriptions       Medication Sig Dispense Start Date End Date Auth. Provider    cyclobenzaprine (FLEXERIL) 10 MG tablet Take 1 tablet (10 mg total) by mouth 3 (three) times daily as needed for Muscle spasms. 30 tablet 8/6/2023 -- Pipo Nieto DO    ibuprofen (ADVIL,MOTRIN) 800 MG tablet Take 1 tablet (800 mg total) by mouth every 8 (eight) hours as needed for Pain. 20 tablet 8/6/2023 -- Pipo Nieto DO          Follow-up Information       Follow up With Specialties Details Why Contact Info    Kelsi Gibbons FNP Family Medicine Schedule an appointment as soon as possible for a visit   [facility] [facility] [address]  [phone]      [facility] Emergency Medicine  If symptoms worsen [address]  [facility] [address]  [phone]               Pipo Nieto,   08/06/23 2013

## 2023-08-09 ENCOUNTER — OFFICE VISIT (OUTPATIENT)
Dept: INTERNAL MEDICINE | Facility: CLINIC | Age: 43
End: 2023-08-09
Payer: MEDICAID

## 2023-08-09 VITALS
TEMPERATURE: 98 F | BODY MASS INDEX: 29.37 KG/M2 | HEIGHT: 71 IN | RESPIRATION RATE: 20 BRPM | WEIGHT: 209.81 LBS | DIASTOLIC BLOOD PRESSURE: 76 MMHG | SYSTOLIC BLOOD PRESSURE: 111 MMHG | OXYGEN SATURATION: 98 % | HEART RATE: 76 BPM

## 2023-08-09 DIAGNOSIS — Z13.1 SCREENING FOR DIABETES MELLITUS: ICD-10-CM

## 2023-08-09 DIAGNOSIS — Z12.5 SCREENING FOR PROSTATE CANCER: ICD-10-CM

## 2023-08-09 DIAGNOSIS — Z00.00 WELLNESS EXAMINATION: Primary | ICD-10-CM

## 2023-08-09 DIAGNOSIS — R07.81 RIB PAIN ON LEFT SIDE: ICD-10-CM

## 2023-08-09 DIAGNOSIS — Z11.3 ROUTINE SCREENING FOR STI (SEXUALLY TRANSMITTED INFECTION): ICD-10-CM

## 2023-08-09 PROCEDURE — 3044F PR MOST RECENT HEMOGLOBIN A1C LEVEL <7.0%: ICD-10-PCS | Mod: CPTII,,, | Performed by: NURSE PRACTITIONER

## 2023-08-09 PROCEDURE — 3008F PR BODY MASS INDEX (BMI) DOCUMENTED: ICD-10-PCS | Mod: CPTII,,, | Performed by: NURSE PRACTITIONER

## 2023-08-09 PROCEDURE — 3074F PR MOST RECENT SYSTOLIC BLOOD PRESSURE < 130 MM HG: ICD-10-PCS | Mod: CPTII,,, | Performed by: NURSE PRACTITIONER

## 2023-08-09 PROCEDURE — 3074F SYST BP LT 130 MM HG: CPT | Mod: CPTII,,, | Performed by: NURSE PRACTITIONER

## 2023-08-09 PROCEDURE — 1160F PR REVIEW ALL MEDS BY PRESCRIBER/CLIN PHARMACIST DOCUMENTED: ICD-10-PCS | Mod: CPTII,,, | Performed by: NURSE PRACTITIONER

## 2023-08-09 PROCEDURE — 1159F PR MEDICATION LIST DOCUMENTED IN MEDICAL RECORD: ICD-10-PCS | Mod: CPTII,,, | Performed by: NURSE PRACTITIONER

## 2023-08-09 PROCEDURE — 99215 OFFICE O/P EST HI 40 MIN: CPT | Mod: PBBFAC | Performed by: NURSE PRACTITIONER

## 2023-08-09 PROCEDURE — 99214 OFFICE O/P EST MOD 30 MIN: CPT | Mod: S$PBB,,, | Performed by: NURSE PRACTITIONER

## 2023-08-09 PROCEDURE — 3008F BODY MASS INDEX DOCD: CPT | Mod: CPTII,,, | Performed by: NURSE PRACTITIONER

## 2023-08-09 PROCEDURE — 1159F MED LIST DOCD IN RCRD: CPT | Mod: CPTII,,, | Performed by: NURSE PRACTITIONER

## 2023-08-09 PROCEDURE — 3078F PR MOST RECENT DIASTOLIC BLOOD PRESSURE < 80 MM HG: ICD-10-PCS | Mod: CPTII,,, | Performed by: NURSE PRACTITIONER

## 2023-08-09 PROCEDURE — 3044F HG A1C LEVEL LT 7.0%: CPT | Mod: CPTII,,, | Performed by: NURSE PRACTITIONER

## 2023-08-09 PROCEDURE — 99214 PR OFFICE/OUTPT VISIT, EST, LEVL IV, 30-39 MIN: ICD-10-PCS | Mod: S$PBB,,, | Performed by: NURSE PRACTITIONER

## 2023-08-09 PROCEDURE — 1160F RVW MEDS BY RX/DR IN RCRD: CPT | Mod: CPTII,,, | Performed by: NURSE PRACTITIONER

## 2023-08-09 PROCEDURE — 3078F DIAST BP <80 MM HG: CPT | Mod: CPTII,,, | Performed by: NURSE PRACTITIONER

## 2023-08-09 RX ORDER — ACETAMINOPHEN AND CODEINE PHOSPHATE 300; 30 MG/1; MG/1
1 TABLET ORAL EVERY 8 HOURS PRN
Qty: 9 TABLET | Refills: 0 | Status: SHIPPED | OUTPATIENT
Start: 2023-08-09 | End: 2023-08-12

## 2023-08-09 RX ORDER — DOCUSATE SODIUM 100 MG/1
100 CAPSULE, LIQUID FILLED ORAL 2 TIMES DAILY PRN
Qty: 60 CAPSULE | Refills: 0
Start: 2023-08-09

## 2023-08-09 NOTE — ASSESSMENT & PLAN NOTE
Pain control; obtain medications from pharmacy  PE revealed normal breath sounds and O2 saturation. Can repeat XR in 1-2 weeks if symptoms persist  Work excuse given through Monday  Rest

## 2023-08-09 NOTE — LETTER
August 9, 2023      [facility]  [facility] [facility] [address]  Phone: [phone]       Patient: Edin Branch   YOB: 1980  Date of Visit: 08/09/2023    To Whom It May Concern:    Sheeba Branch  was at [facility] on 08/09/2023. The patient may return to work/school on 8/15/2023 with no restrictions. If you have any questions or concerns, or if I can be of further assistance, please do not hesitate to contact me.    Sincerely,      EMMANUEL Hooper

## 2023-08-09 NOTE — PROGRESS NOTES
"EMMANUEL Hooper   [facility]  [address] [facility] [address]      PATIENT NAME: Edin Branch  : 1980  DATE: 23  MRN: [id number]        Reason for Visit / Chief Complaint: Follow-up (ED follow up Rib pain)       History of Present Illness / Problem Focused Workflow     Edin Branch presents to the clinic with Follow-up (ED follow up Rib pain)     Initial Visit (2021): 41 y.o. male presenting to [facility] to establish primary care.   Previous PCP: None   PmHx: TBO, DDD neck and lumbar spine   SHx: Denies   FHx: Oral cancer (mother, maternal grandfather; both smokers), throat cancer (maternal grandmother), HTN, DM, Heart Attack (father at age of 35 and paternal grandfather--age at death unknown, but  in )   Complaints today: Establish primary care.   ED visit on 21 with c/o paresthesias to left arm and left leg. He underwent CT C-spine and L-spine consistent with degenerative disc disease. He denies any major injuries or accidents during his lifetime, but admits playing football from childhood through high school. He is currently taking Gabapentin and Prednisone. He received a Toradol and steroid injection in the ED. States pain somewhat improved, but he is still experiencing some discomfort in LLE with prolonged ambulation. Denies falls.   Pt was also diagnosed with HTN during ED visit and prescribed Lisinopril 5 mg daily. SBP was noted as high as 200s. Pt admits that he never obtained the Lisinopril as he attributed the elevated BP to pain. States "never had it (HTN) before...I don't like a lot of medicine." BP improved today. He denies fever, chills, HA, weakness, dizziness, chest pain, SOB, lower ext edema, or blurred vision.     (2022): Pt presenting for f/u to review results. All labs reviewed and unremarkable.   PSA WNL   HgA1c 5.5   CBC, CMP, Lipid, TSH unremarkable or WNL   HIV, Hepatitis, GC negative   " "Trace blood on UA. Seen on UA years ago. Denies gross hematuria. He does smoke cigarettes.      Bp at goal today. He is still not taking the Lisinopril. He continues with c/o neck and lower back pain radiating to LLE. Previously given a trial of Mobic as he reported nothing worked in the past. States Mobic was not effective. He now has health insurance and is eager to go to [facility]. States ready to get back to work. No other concerns.     (5/18/2022): Pt presenting for f/u to review results of CT A/P. He underwent CT on 4/8/22 to further evaluate microhematuria. Significant findings included:  "There is no pelvic free fluid. The abdominal aorta is normal in  caliber. There is moderate atherosclerotic disease which is advanced  for patient's age. No retroperitoneal adenopathy.      Mild degenerative change of the spine primarily at L5-S1. Small  fat-containing paraumbilical hernia.     Impression.  1. Mild bladder wall thickening probably accentuated by under  distention but cystitis is possible.  2. No urinary tract calculi or enhancing masses."  Patient is a smoker. He denies dysuria, overt blood in urine, or abnl urine color or odor.      (8/9/2022): Pt presenting for routine f/u. Lab results pending. Since last visit, he's been referred to Dr. Flores/[facility] for PVD. [facility] had one visit. Scheduled for calcium test, treadmill stress test, and LE US but had to reschedule them all due to COVID-19 infection 7/24/22. [facility] most tests rescheduled for later this month. He's also been referred to [facility] for MSH. Appt 10/20/22. As previously stated, he visited the ED on 7/24/22 with c/o fever, CP, and jaw pain. TST revealed sinus tach, otherwise normal. CXR normal. He tested + for COVID. Overall, recovered. States still fatigued from time to time with occasional HA, but no chest pain, SOB, or LE swelling.        2/9/23: Pt presenting for routine f/u. Labs completed this am unremarkable besides triglyerides 200s. States ate late " last night. He has been evaluated in [facility] with calcium test, treadmill stress test, and LE US since last visit. States all tests unremarkable. Also s/p cysto in October that was negative. See below. Today, he is c/o left arm pain, starting at top of arm, and traveling to fingers. Pain shooting and associated with numbness and tingling. He has a h/o DDD C spine seen on a CT of joint in 2021. He was referred to [facility]. for DDD of L spine and C spine in 2022. He attended an initial evaluation 3/2/22 and completed P.T. 7/4/2022. He related P.T. improved symptoms, but left arm pain became exacerbated about 3 weeks ago. He works in maintenance and drives trucks. Admits certain activities (ie holding steering wheel) exacerbate the discomfort. He relates cyclobenzaprine and Gabapentin minimally effective. He has been performing HEP shown to him by P.T. for the last 3 weeks with minimal relief in symptoms.         S/p cysto for [facility] 10/20/22:  Cystoscopy:  - Penis:  Circumcised, no lesions  - Urethral meatus:  No strictures  - Urethra:  Normal without strictures or lesions  - Prostate:  Not enlarged  - Bladder neck:  Normal  - Bladder:  No mucosal abnormalities   - Ureteral orifices:  On the trigone with clear efflux bilaterally     The patient tolerated the procedure well without complications.  He was given Cipro 500mg, one tablet in the clinic.   The urethra was anesthetized with 2% Lidocaine Jelly, Urojet.       Neck Pain   This is a chronic problem. The current episode started 1 to 4 weeks ago. The problem occurs constantly. The problem has been gradually worsening. The pain is associated with a sleep position. The quality of the pain is described as burning and shooting. The pain is at a severity of 10/10. The pain is severe. The symptoms are aggravated by position. The pain is Worse during the day. Associated symptoms include numbness and tingling. Pertinent negatives include no chest pain, fever, headaches, leg pain, pain  "with swallowing, paresis, photophobia, syncope, trouble swallowing, visual change, weakness or weight loss. He has tried home exercises and muscle relaxants for the symptoms. The treatment provided mild relief.     8/9/23: Patient presenting for 6-month f/u. He was to complete a lipid panel and CMP prior to appt, but did not complete. He visited the ER on 8/6/23 with c/o rib pain on left side.  was wrestling with his nephew on 8/6/23.  nephew put him in a bear hug and "when he put me down, I passed out."  went to ER with concerns about broken ribs. His CXR revealed:  "FINDINGS:  Normal cardiac silhouette.  The lungs are well-inflated.  No consolidation identified.  No significant pleural effusion or discernible pneumothorax.  Impression:  No acute pulmonary process identified."    He was given Toradol in ED, then prescribed IBU and Flexeril for PRN use. He admits that he hasn't obtained the prescriptions because he didn't know they were sent to the pharmacy here. He is taking [place] Back and Body with no relief. States difficult to breathe, cough, or strain for BM due to rib discomfort. So, he had to take an OTC stool softener yesterday because he had not had a BM since Sunday. Oxygen saturation 98% on RA.    No other concerns.        Review of Systems     Review of Systems   Constitutional: Negative.  Negative for activity change, appetite change, chills, diaphoresis, fatigue, fever and unexpected weight change.   HENT: Negative.  Negative for trouble swallowing.    Eyes: Negative.    Respiratory: Negative.  Negative for apnea, cough, choking, chest tightness, shortness of breath, wheezing and stridor.    Cardiovascular:  Positive for chest pain (rib pain on left side). Negative for palpitations and leg swelling.   Gastrointestinal: Negative.    Endocrine: Negative.    Genitourinary: Negative.    Musculoskeletal:  Positive for arthralgias. Negative for back pain, gait problem, joint swelling, " myalgias, neck pain and neck stiffness.   Skin: Negative.    Allergic/Immunologic: Negative.    Neurological: Negative.  Negative for dizziness, tremors, seizures, syncope, facial asymmetry, speech difficulty, weakness, light-headedness, numbness and headaches.   Hematological: Negative.    Psychiatric/Behavioral:  Negative for sleep disturbance and suicidal ideas.        Medical / Social / Family History     Past Medical History:   Diagnosis Date    Rheumatoid arthritis, unspecified        Past Surgical History:   Procedure Laterality Date    FINGER SURGERY         Social History    reports that he has been smoking cigarettes. He started smoking about 27 years ago. He has a 13.8 pack-year smoking history. He has never used smokeless tobacco. He reports current alcohol use of about 6.0 standard drinks of alcohol per week. He reports that he does not currently use drugs.    Family History  's family history includes Cancer in his mother; Diabetes in his brother and maternal grandmother; Heart attack in his father and paternal grandmother; Hypertension in his maternal grandmother; Stomach cancer in his maternal grandfather.    Medications and Allergies     Medications  Current Outpatient Medications   Medication Instructions    acetaminophen-codeine 300-30mg (TYLENOL-CODEINE #3) 300-30 mg Tab 1 tablet, Oral, Every 8 hours PRN    aspirin/caffeine (CECILIA BACK AND BODY ORAL) Oral    cyclobenzaprine (FLEXERIL) 10 mg, Oral, 3 times daily PRN    docusate sodium (COLACE) 100 mg, Oral, 2 times daily PRN    gabapentin (NEURONTIN) 300 MG capsule SMARTSI Capsule(s) By Mouth Every 12 Hours PRN    ibuprofen (ADVIL,MOTRIN) 800 mg, Oral, Every 8 hours PRN       Allergies  Review of patient's allergies indicates:  No Known Allergies    Physical Examination   Visit Vitals  /76 (BP Location: Right arm, Patient Position: Sitting, BP Method: Medium (Automatic))   Pulse 76   Temp 98.3 °F (36.8 °C) (Oral)   Resp 20   Ht 5'  "11" (1.803 m)   Wt 95.2 kg (209 lb 12.8 oz)   SpO2 98%   BMI 29.26 kg/m²     Physical Exam  Constitutional:       Appearance: Normal appearance.   HENT:      Head: Normocephalic and atraumatic.      Right Ear: External ear normal.      Left Ear: External ear normal.   Eyes:      Extraocular Movements: Extraocular movements intact.   Cardiovascular:      Rate and Rhythm: Normal rate and regular rhythm.      Pulses: Normal pulses.      Heart sounds: Normal heart sounds.   Pulmonary:      Effort: Pulmonary effort is normal.      Breath sounds: Normal breath sounds.   Chest:      Chest wall: Tenderness present.   Abdominal:      General: Bowel sounds are normal.      Palpations: Abdomen is soft.   Musculoskeletal:         General: Normal range of motion.      Right lower leg: No edema.      Left lower leg: No edema.   Skin:     General: Skin is warm and dry.   Neurological:      General: No focal deficit present.      Mental Status: He is alert and oriented to person, place, and time.   Psychiatric:         Mood and Affect: Mood normal.         Behavior: Behavior normal.         Thought Content: Thought content normal.         Judgment: Judgment normal.           Results     Chemistry:  Lab Results   Component Value Date     07/07/2023    K 4.3 07/07/2023    CHLORIDE 106 07/07/2023    BUN 10.3 07/07/2023    CREATININE 1.07 07/07/2023    EGFRNORACEVR >60 07/07/2023    GLUCOSE 93 07/07/2023    CALCIUM 9.2 07/07/2023    ALKPHOS 54 07/07/2023    LABPROT 6.9 07/07/2023    ALBUMIN 4.1 07/07/2023    BILIDIR 0.2 02/02/2022    IBILI 0.30 02/02/2022    AST 18 07/07/2023    ALT 18 07/07/2023        Lab Results   Component Value Date    HGBA1C 5.1 02/09/2023        Hematology:  Lab Results   Component Value Date    WBC 7.47 07/07/2023    HGB 14.9 07/07/2023    HCT 44.9 07/07/2023     07/07/2023       Lipid Panel:  Lab Results   Component Value Date    CHOL 178 02/09/2023    HDL 55 02/09/2023    LDL 75.00 02/09/2023    " TRIG 242 (H) 02/09/2023    TOTALCHOLEST 3 02/09/2023        Urine:  Lab Results   Component Value Date    COLORUA Light-Yellow 07/07/2023    APPEARANCEUA Clear 07/07/2023    SGUA 1.019 07/07/2023    PHUA 7.0 07/07/2023    PROTEINUA Negative 07/07/2023    GLUCOSEUA Normal 07/07/2023    KETONESUA Negative 07/07/2023    BLOODUA Negative 07/07/2023    NITRITESUA Negative 07/07/2023    LEUKOCYTESUR Negative 07/07/2023    RBCUA 0-5 07/07/2023    WBCUA 0-5 07/07/2023    BACTERIA Trace (A) 07/07/2023    SQEPUA Trace (A) 07/07/2023    HYALINECASTS None Seen 07/07/2023          Assessment        ICD-10-CM ICD-9-CM   1. Wellness examination  Z00.00 V70.0   2. Rib pain on left side  R07.81 786.50   3. Screening for diabetes mellitus  Z13.1 V77.1   4. Screening for prostate cancer  Z12.5 V76.44   5. Routine screening for STI (sexually transmitted infection)  Z11.3 V74.5        Plan (including Health Maintenance)     Problem List Items Addressed This Visit          Pulmonary    Rib pain on left side    Current Assessment & Plan     Pain control; obtain medications from pharmacy  PE revealed normal breath sounds and O2 saturation. Can repeat XR in 1-2 weeks if symptoms persist  Work excuse given through Monday  Rest           Relevant Medications    docusate sodium (COLACE) 100 MG capsule    acetaminophen-codeine 300-30mg (TYLENOL-CODEINE #3) 300-30 mg Tab    Other Relevant Orders    X-Ray Ribs 2 View Left     Other Visit Diagnoses       Wellness examination    -  Primary    Relevant Orders    Urinalysis, Reflex to Urine Culture    TSH    Lipid Panel    Comprehensive Metabolic Panel    CBC Auto Differential    Screening for diabetes mellitus        Relevant Orders    Hemoglobin A1C    Screening for prostate cancer        Relevant Orders    PSA, Screening    Routine screening for STI (sexually transmitted infection)        Relevant Orders    Hepatitis Panel, Acute    HIV 1/2 Ag/Ab (4th Gen)    SYPHILIS ANTIBODY (WITH REFLEX RPR)               Health Maintenance Due   Topic Date Due    COVID-19 Vaccine (1) Never done    Pneumococcal Vaccines (Age 0-64) (1 - PCV) Never done    TETANUS VACCINE  Never done    High Dose Statin  Never done       Future Appointments   Date Time Provider Department Center   11/2/2023  9:45 AM Kristine Oseguera DO [facility]   2/20/2024  9:30 AM [facility]      For any new or worsening symptoms that are urgent, or for any s/s of MI, CVA, or any other emergent concerns, please visit the ER for further eval. Otherwise, call clinic with questions or concerns.      Follow up in about 6 months (around 2/9/2024).    Signature:  EMMANUEL Hooper  [facility]  [address] [address]    Date of encounter: 8/9/23

## 2023-08-14 ENCOUNTER — HOSPITAL ENCOUNTER (OUTPATIENT)
Dept: RADIOLOGY | Facility: HOSPITAL | Age: 43
Discharge: HOME | End: 2023-08-14
Attending: NURSE PRACTITIONER
Payer: MEDICAID

## 2023-08-14 ENCOUNTER — TELEPHONE (OUTPATIENT)
Dept: INTERNAL MEDICINE | Facility: CLINIC | Age: 43
End: 2023-08-14
Payer: MEDICAID

## 2023-08-14 DIAGNOSIS — R07.81 RIB PAIN ON LEFT SIDE: ICD-10-CM

## 2023-08-14 PROCEDURE — 71100 X-RAY EXAM RIBS UNI 2 VIEWS: CPT | Mod: TC,LT

## 2023-08-14 NOTE — LETTER
August 14, 2023      [facility]  [facility] [facility] [address]  Phone: [phone]       Patient: Edin Branch   YOB: 1980  Date of Visit: 08/14/2023    To Whom It May Concern:    Sheeba Branch  was at [facility] on 08/14/2023. The patient may return to work/school on 08/21/2023 with restrictions: no heavy lifting greater than 5 pounds. If you have any questions or concerns, or if I can be of further assistance, please do not hesitate to contact me.    Sincerely,      EMMANUEL Hooper

## 2023-08-15 ENCOUNTER — TELEPHONE (OUTPATIENT)
Dept: INTERNAL MEDICINE | Facility: CLINIC | Age: 43
End: 2023-08-15
Payer: MEDICAID

## 2023-11-02 ENCOUNTER — OFFICE VISIT (OUTPATIENT)
Dept: UROLOGY | Facility: CLINIC | Age: 43
End: 2023-11-02
Payer: MEDICAID

## 2023-11-02 VITALS
OXYGEN SATURATION: 100 % | HEIGHT: 70 IN | HEART RATE: 80 BPM | SYSTOLIC BLOOD PRESSURE: 137 MMHG | RESPIRATION RATE: 20 BRPM | DIASTOLIC BLOOD PRESSURE: 88 MMHG | BODY MASS INDEX: 30.3 KG/M2 | TEMPERATURE: 98 F | WEIGHT: 211.63 LBS

## 2023-11-02 DIAGNOSIS — J30.9 ALLERGIC SINUSITIS: ICD-10-CM

## 2023-11-02 DIAGNOSIS — Z12.5 PROSTATE CANCER SCREENING: ICD-10-CM

## 2023-11-02 DIAGNOSIS — R31.21 ASYMPTOMATIC MICROSCOPIC HEMATURIA: Primary | ICD-10-CM

## 2023-11-02 LAB
BILIRUB SERPL-MCNC: NEGATIVE MG/DL
BLOOD URINE, POC: NORMAL
COLOR, POC UA: YELLOW
GLUCOSE UR QL STRIP: NEGATIVE
KETONES UR QL STRIP: NEGATIVE
LEUKOCYTE ESTERASE URINE, POC: NORMAL
NITRITE, POC UA: NEGATIVE
PH, POC UA: 5.5
PROTEIN, POC: NEGATIVE
SPECIFIC GRAVITY, POC UA: 1.03
UROBILINOGEN, POC UA: 0.2

## 2023-11-02 PROCEDURE — 3079F PR MOST RECENT DIASTOLIC BLOOD PRESSURE 80-89 MM HG: ICD-10-PCS | Mod: CPTII,,, | Performed by: UROLOGY

## 2023-11-02 PROCEDURE — 99214 OFFICE O/P EST MOD 30 MIN: CPT | Mod: S$PBB,,, | Performed by: UROLOGY

## 2023-11-02 PROCEDURE — 1160F RVW MEDS BY RX/DR IN RCRD: CPT | Mod: CPTII,,, | Performed by: UROLOGY

## 2023-11-02 PROCEDURE — 1160F PR REVIEW ALL MEDS BY PRESCRIBER/CLIN PHARMACIST DOCUMENTED: ICD-10-PCS | Mod: CPTII,,, | Performed by: UROLOGY

## 2023-11-02 PROCEDURE — 3075F SYST BP GE 130 - 139MM HG: CPT | Mod: CPTII,,, | Performed by: UROLOGY

## 2023-11-02 PROCEDURE — 99214 PR OFFICE/OUTPT VISIT, EST, LEVL IV, 30-39 MIN: ICD-10-PCS | Mod: S$PBB,,, | Performed by: UROLOGY

## 2023-11-02 PROCEDURE — 1159F MED LIST DOCD IN RCRD: CPT | Mod: CPTII,,, | Performed by: UROLOGY

## 2023-11-02 PROCEDURE — 3044F HG A1C LEVEL LT 7.0%: CPT | Mod: CPTII,,, | Performed by: UROLOGY

## 2023-11-02 PROCEDURE — 1159F PR MEDICATION LIST DOCUMENTED IN MEDICAL RECORD: ICD-10-PCS | Mod: CPTII,,, | Performed by: UROLOGY

## 2023-11-02 PROCEDURE — 3075F PR MOST RECENT SYSTOLIC BLOOD PRESS GE 130-139MM HG: ICD-10-PCS | Mod: CPTII,,, | Performed by: UROLOGY

## 2023-11-02 PROCEDURE — 81001 URINALYSIS AUTO W/SCOPE: CPT | Mod: PBBFAC | Performed by: UROLOGY

## 2023-11-02 PROCEDURE — 99214 OFFICE O/P EST MOD 30 MIN: CPT | Mod: PBBFAC | Performed by: UROLOGY

## 2023-11-02 PROCEDURE — 3079F DIAST BP 80-89 MM HG: CPT | Mod: CPTII,,, | Performed by: UROLOGY

## 2023-11-02 PROCEDURE — 88108 CYTOPATH CONCENTRATE TECH: CPT | Mod: TC | Performed by: UROLOGY

## 2023-11-02 PROCEDURE — 3008F PR BODY MASS INDEX (BMI) DOCUMENTED: ICD-10-PCS | Mod: CPTII,,, | Performed by: UROLOGY

## 2023-11-02 PROCEDURE — 3044F PR MOST RECENT HEMOGLOBIN A1C LEVEL <7.0%: ICD-10-PCS | Mod: CPTII,,, | Performed by: UROLOGY

## 2023-11-02 PROCEDURE — 3008F BODY MASS INDEX DOCD: CPT | Mod: CPTII,,, | Performed by: UROLOGY

## 2023-11-02 RX ORDER — LORATADINE 10 MG/1
10 TABLET ORAL DAILY
Qty: 30 TABLET | Refills: 1 | Status: SHIPPED | OUTPATIENT
Start: 2023-11-02 | End: 2024-01-01

## 2023-11-02 NOTE — LETTER
November 2, 2023      [facility]  [address] [facility] [address] [facility] [address]  Phone: [phone]       Patient: Edin Branch   YOB: 1980  Date of Visit: 11/02/2023    To Whom It May Concern:    Sheeba Branch  was at [facility] on 11/02/2023. The patient may return to work/school on 11/02/2023 with no restrictions. If you have any questions or concerns, or if I can be of further assistance, please do not hesitate to contact me.    Sincerely,        Brie Barbosa RN

## 2023-11-02 NOTE — PROGRESS NOTES
CC:  Hematuria    HPI:  Edin Branch is a 42 y.o. male seen for follow-up of hematuria.  He has a history of microscopic hematuria.  He had negative cystoscopy in October 2022.  Had a CT prior to that was negative.  He also had a repeat CT scan in July of 2023 which showed normal urinary tract.  He has no urinary complaints.  He had a PSA in February of 2023 which was 0.91.  He has a family history of various cancers but not specifically prostate cancer.  He complains today of sinus congestion which typically is seasonal for him.  He requests a prescription for loratadine.  He has been getting sinus headaches.       Urinalysis:  Results for orders placed or performed in visit on 11/02/23   POCT URINE DIPSTICK WITH MICROSCOPE, AUTOMATED   Result Value Ref Range    Color, UA Yellow     Spec Grav UA 1.030     pH, UA 5.5     WBC, UA Trace     Nitrite, UA Negative     Protein, POC Negative     Glucose, UA Negative     Ketones, UA Negative     Urobilinogen, UA 0.2     Bilirubin, POC Negative     Blood, UA Trace-intact      Microscopic:  1-2 RBC per HPF     Lab Results:  PSA History:     Latest Reference Range & Units 02/02/22 10:45 02/09/23 08:19   Prostate Specific Antigen <=4.00 ng/mL 0.79 0.91       Imaging:  CT - 23 July 2023:  Urinary tract is negative      ROS:  All systems reviewed and are negative except as documented in HPI and/or Assessment and Plan.     Patient Active Problem List:     Patient Active Problem List   Diagnosis    Hypertension    Obesity    Blindness of right eye    DDD (degenerative disc disease), lumbar    DDD (degenerative disc disease), cervical    Atherosclerosis of arteries    Microhematuria    History of COVID-19    Tobacco use    Neuropathy, arm, left    Hypertriglyceridemia    Rib pain on left side        Past Medical History:  Past Medical History:   Diagnosis Date    Rheumatoid arthritis, unspecified         Past Surgical History:  Past Surgical History:   Procedure Laterality Date     FINGER SURGERY          Family History:  Family History   Problem Relation Age of Onset    Cancer Mother     Heart attack Father     Diabetes Brother     Diabetes Maternal Grandmother     Hypertension Maternal Grandmother     Stomach cancer Maternal Grandfather     Heart attack Paternal Grandmother         Social History:  Social History     Socioeconomic History    Marital status: Single    Number of children: 4    Highest education level: 10th grade   Occupational History    Occupation: [employer]   Tobacco Use    Smoking status: Every Day     Current packs/day: 0.50     Average packs/day: 0.5 packs/day for 27.8 years (13.9 ttl pk-yrs)     Types: Cigarettes     Start date: 1/1/1996    Smokeless tobacco: Never   Substance and Sexual Activity    Alcohol use: Yes     Alcohol/week: 6.0 standard drinks of alcohol     Types: 6 Cans of beer per week     Comment: 6 beers /weekend    Drug use: Not Currently    Sexual activity: Yes     Partners: Female     Social Determinants of Health     Financial Resource Strain: Medium Risk (8/9/2023)    Overall Financial Resource Strain (CARDIA)     Difficulty of Paying Living Expenses: Somewhat hard   Food Insecurity: Food Insecurity Present (8/9/2023)    Hunger Vital Sign     Worried About Running Out of Food in the Last Year: Sometimes true     Ran Out of Food in the Last Year: Sometimes true   Transportation Needs: No Transportation Needs (8/9/2023)    PRAPARE - Transportation     Lack of Transportation (Medical): No     Lack of Transportation (Non-Medical): No   Physical Activity: Inactive (8/9/2023)    Exercise Vital Sign     Days of Exercise per Week: 0 days     Minutes of Exercise per Session: 0 min   Stress: Stress Concern Present (8/9/2023)    [employer] - Occupational Stress Questionnaire     Feeling of Stress : To some extent   Social Connections: Moderately Isolated (8/9/2023)    Social Connection and Isolation Panel [NHANES]     Frequency of  Communication with Friends and Family: More than three times a week     Frequency of Social Gatherings with Friends and Family: More than three times a week     Attends Shinto Services: Never     Active Member of Clubs or Organizations: Yes     Attends Club or Organization Meetings: More than 4 times per year     Marital Status:    Housing Stability: High Risk (8/9/2023)    Housing Stability Vital Sign     Unable to Pay for Housing in the Last Year: Yes     Number of Places Lived in the Last Year: 1     Unstable Housing in the Last Year: No        Allergies:  Review of patient's allergies indicates:  No Known Allergies     Objective:  Vitals:    11/02/23 0959   BP: 137/88   Pulse: 80   Resp: 20   Temp: 97.5 °F (36.4 °C)     General:  Well developed, well nourished adult male in no acute distress  Abdomen: Soft, nontender, no masses  Extremities:  No clubbing, cyanosis, or edema  Neurologic:  Grossly intact  Musculoskeletal:  Normal tone    Assessment:  1. Asymptomatic microscopic hematuria  - POCT URINE DIPSTICK WITH MICROSCOPE, AUTOMATED  - Cytology, Urine    2. Prostate cancer screening    3. Allergic sinusitis  - loratadine (CLARITIN) 10 mg tablet; Take 1 tablet (10 mg total) by mouth once daily.  Dispense: 30 tablet; Refill: 1     Plan:  Urine cytology today.  Call with results.    PSA is normal as of February 2023.  Will watch this yearly.  Given Loratadine.  If that is not effective he will see his primary care.      Follow-up:  One year for cytology.

## 2023-11-06 ENCOUNTER — TELEPHONE (OUTPATIENT)
Dept: UROLOGY | Facility: CLINIC | Age: 43
End: 2023-11-06
Payer: MEDICAID

## 2023-11-06 LAB
ESTROGEN SERPL-MCNC: NORMAL PG/ML
INSULIN SERPL-ACNC: NORMAL U[IU]/ML
LAB AP CLINICAL INFORMATION: NORMAL
LAB AP GROSS DESCRIPTION: NORMAL
LAB AP NON-GYN INTERPRETATION SPECIMEN 1: NORMAL

## 2024-01-03 ENCOUNTER — OFFICE VISIT (OUTPATIENT)
Dept: URGENT CARE | Facility: CLINIC | Age: 44
End: 2024-01-03
Payer: MEDICAID

## 2024-01-03 VITALS
RESPIRATION RATE: 16 BRPM | BODY MASS INDEX: 29.8 KG/M2 | WEIGHT: 208.19 LBS | HEART RATE: 86 BPM | SYSTOLIC BLOOD PRESSURE: 131 MMHG | HEIGHT: 70 IN | DIASTOLIC BLOOD PRESSURE: 94 MMHG | TEMPERATURE: 99 F

## 2024-01-03 DIAGNOSIS — R68.89 FLU-LIKE SYMPTOMS: ICD-10-CM

## 2024-01-03 DIAGNOSIS — J06.9 VIRAL URI: Primary | ICD-10-CM

## 2024-01-03 LAB
CTP QC/QA: YES
MOLECULAR STREP A: NEGATIVE
POC MOLECULAR INFLUENZA A AGN: NEGATIVE
POC MOLECULAR INFLUENZA B AGN: NEGATIVE
SARS-COV-2 RDRP RESP QL NAA+PROBE: NEGATIVE

## 2024-01-03 PROCEDURE — 87502 INFLUENZA DNA AMP PROBE: CPT | Mod: PBBFAC | Performed by: FAMILY MEDICINE

## 2024-01-03 PROCEDURE — 99213 OFFICE O/P EST LOW 20 MIN: CPT | Mod: PBBFAC | Performed by: FAMILY MEDICINE

## 2024-01-03 PROCEDURE — 99213 OFFICE O/P EST LOW 20 MIN: CPT | Mod: S$PBB,,, | Performed by: FAMILY MEDICINE

## 2024-01-03 PROCEDURE — 87651 STREP A DNA AMP PROBE: CPT | Mod: PBBFAC | Performed by: FAMILY MEDICINE

## 2024-01-03 PROCEDURE — 87635 SARS-COV-2 COVID-19 AMP PRB: CPT | Mod: PBBFAC | Performed by: FAMILY MEDICINE

## 2024-01-03 RX ORDER — PREDNISONE 20 MG/1
20 TABLET ORAL 2 TIMES DAILY
Qty: 10 TABLET | Refills: 0 | Status: SHIPPED | OUTPATIENT
Start: 2024-01-03 | End: 2024-01-08

## 2024-01-03 RX ORDER — PROMETHAZINE HYDROCHLORIDE AND DEXTROMETHORPHAN HYDROBROMIDE 6.25; 15 MG/5ML; MG/5ML
5 SYRUP ORAL
Qty: 120 ML | Refills: 0 | Status: SHIPPED | OUTPATIENT
Start: 2024-01-03

## 2024-01-03 NOTE — LETTER
January 3, 2024      [facility]  [facility] [facility] [address]  Phone: [phone]       Patient: Edin Branch   YOB: 1980  Date of Visit: 01/03/2024    To Whom It May Concern:    Sheeba Branch  was at [facility] on 01/03/2024. The patient may return to work/school on 1/6/24 with no restrictions. If you have any questions or concerns, or if I can be of further assistance, please do not hesitate to contact me.    Sincerely,    NANCY Valdez MD

## 2024-01-04 NOTE — PROGRESS NOTES
"Subjective:       Patient ID: Edin Branch is a 43 y.o. male.    Vitals:  height is 5' 10" (1.778 m) and weight is 94.4 kg (208 lb 3.2 oz). His oral temperature is 99.4 °F (37.4 °C). His blood pressure is 131/94 (abnormal) and his pulse is 86. His respiration is 16 and oxygen saturation is 100% (pended).     Chief Complaint: URI (Gen body ache, congestion, headache, 3 days)    Patient with 3 days of myalgias, headache with no neuro symptoms, rhinorrhea, cough.    URI   Pertinent negatives include no abdominal pain, chest pain, nausea, rash, sinus pain, swollen glands or wheezing.         HENT:  Negative for sinus pain.    Cardiovascular:  Negative for chest pain.   Respiratory:  Negative for wheezing.    Gastrointestinal:  Negative for abdominal pain and nausea.   Skin:  Negative for rash.       Objective:   Physical Exam   Constitutional: He appears well-developed.  Non-toxic appearance. He does not appear ill. No distress.   HENT:   Head: Atraumatic.   Nose: No purulent discharge. Right sinus exhibits no maxillary sinus tenderness and no frontal sinus tenderness. Left sinus exhibits no maxillary sinus tenderness and no frontal sinus tenderness.   Mouth/Throat: Uvula is midline. No oropharyngeal exudate or posterior oropharyngeal erythema.   Eyes: Right eye exhibits no discharge. Left eye exhibits no discharge. Extraocular movement intact   Neck: Neck supple. No neck rigidity present.   Cardiovascular: Regular rhythm.   Pulmonary/Chest: Effort normal and breath sounds normal. No respiratory distress. He has no wheezes. He has no rales.   Lymphadenopathy:     He has no cervical adenopathy.   Neurological: He is alert.   Skin: Skin is warm, dry and not diaphoretic.   Psychiatric: His behavior is normal.   Nursing note and vitals reviewed.    Results for orders placed or performed in visit on 01/03/24   POCT COVID-19 Rapid Screening   Result Value Ref Range    POC Rapid COVID Negative Negative     " Acceptable Yes    POCT Influenza A/B MOLECULAR   Result Value Ref Range    POC Molecular Influenza A Ag Negative Negative, Not Reported    POC Molecular Influenza B Ag Negative Negative, Not Reported     Acceptable Yes    POCT Strep A, Molecular   Result Value Ref Range    Molecular Strep A, POC Negative Negative     Acceptable Yes            Assessment:     1. Viral URI    2. Flu-like symptoms          Plan:   Will give a course of prednisone.  Phenergan DM with side effect precautions.  Consider repeat COVID testing if condition worsens.    Please follow instructions on patient education material.  Return to urgent care in 2 to 3 days if symptoms are not improving. Seek care immediately if new or worsening symptoms develop.    Viral URI  -     predniSONE (DELTASONE) 20 MG tablet; Take 1 tablet (20 mg total) by mouth 2 (two) times daily. for 5 days  Dispense: 10 tablet; Refill: 0  -     promethazine-dextromethorphan (PROMETHAZINE-DM) 6.25-15 mg/5 mL Syrp; Take 5 mLs by mouth every 6 to 8 hours as needed (cough). May cause sedation.  Do not drive or operate heavy machinery.  Dispense: 120 mL; Refill: 0    Flu-like symptoms  -     POCT COVID-19 Rapid Screening  -     POCT Influenza A/B MOLECULAR  -     POCT Strep A, Molecular        Please note: This chart was completed via voice to text dictation. It may contain typographical/word recognition errors. If there are any questions, please contact the provider for final clarification.

## 2024-06-25 ENCOUNTER — LAB VISIT (OUTPATIENT)
Dept: LAB | Facility: HOSPITAL | Age: 44
End: 2024-06-25
Attending: NURSE PRACTITIONER
Payer: MEDICAID

## 2024-06-25 ENCOUNTER — OFFICE VISIT (OUTPATIENT)
Dept: INTERNAL MEDICINE | Facility: CLINIC | Age: 44
End: 2024-06-25
Payer: MEDICAID

## 2024-06-25 VITALS
TEMPERATURE: 98 F | WEIGHT: 201.81 LBS | BODY MASS INDEX: 28.89 KG/M2 | DIASTOLIC BLOOD PRESSURE: 68 MMHG | HEIGHT: 70 IN | RESPIRATION RATE: 16 BRPM | HEART RATE: 79 BPM | SYSTOLIC BLOOD PRESSURE: 106 MMHG

## 2024-06-25 DIAGNOSIS — M54.12 CERVICAL RADICULOPATHY: Primary | ICD-10-CM

## 2024-06-25 DIAGNOSIS — I10 PRIMARY HYPERTENSION: ICD-10-CM

## 2024-06-25 DIAGNOSIS — E78.1 HYPERTRIGLYCERIDEMIA: ICD-10-CM

## 2024-06-25 LAB
ALBUMIN SERPL-MCNC: 4 G/DL (ref 3.5–5)
ALBUMIN/GLOB SERPL: 1.3 RATIO (ref 1.1–2)
ALP SERPL-CCNC: 56 UNIT/L (ref 40–150)
ALT SERPL-CCNC: 15 UNIT/L (ref 0–55)
ANION GAP SERPL CALC-SCNC: 8 MEQ/L
AST SERPL-CCNC: 18 UNIT/L (ref 5–34)
BACTERIA #/AREA URNS AUTO: ABNORMAL /HPF
BASOPHILS # BLD AUTO: 0.05 X10(3)/MCL
BASOPHILS NFR BLD AUTO: 0.8 %
BILIRUB SERPL-MCNC: 0.4 MG/DL
BILIRUB UR QL STRIP.AUTO: NEGATIVE
BUN SERPL-MCNC: 8.1 MG/DL (ref 8.9–20.6)
CALCIUM SERPL-MCNC: 9.6 MG/DL (ref 8.4–10.2)
CHLORIDE SERPL-SCNC: 107 MMOL/L (ref 98–107)
CHOLEST SERPL-MCNC: 165 MG/DL
CHOLEST/HDLC SERPL: 2 {RATIO} (ref 0–5)
CLARITY UR: CLEAR
CO2 SERPL-SCNC: 26 MMOL/L (ref 22–29)
COLOR UR AUTO: ABNORMAL
CREAT SERPL-MCNC: 0.99 MG/DL (ref 0.73–1.18)
CREAT UR-MCNC: 120.4 MG/DL (ref 63–166)
CREAT/UREA NIT SERPL: 8
EOSINOPHIL # BLD AUTO: 0.17 X10(3)/MCL (ref 0–0.9)
EOSINOPHIL NFR BLD AUTO: 2.6 %
ERYTHROCYTE [DISTWIDTH] IN BLOOD BY AUTOMATED COUNT: 15.3 % (ref 11.5–17)
EST. AVERAGE GLUCOSE BLD GHB EST-MCNC: 102.5 MG/DL
GFR SERPLBLD CREATININE-BSD FMLA CKD-EPI: >60 ML/MIN/1.73/M2
GLOBULIN SER-MCNC: 3 GM/DL (ref 2.4–3.5)
GLUCOSE SERPL-MCNC: 86 MG/DL (ref 74–100)
GLUCOSE UR QL STRIP: NORMAL
HBA1C MFR BLD: 5.2 %
HCT VFR BLD AUTO: 45 % (ref 42–52)
HDLC SERPL-MCNC: 80 MG/DL (ref 35–60)
HGB BLD-MCNC: 15.6 G/DL (ref 14–18)
HGB UR QL STRIP: NEGATIVE
HYALINE CASTS #/AREA URNS LPF: ABNORMAL /LPF
IMM GRANULOCYTES # BLD AUTO: 0.02 X10(3)/MCL (ref 0–0.04)
IMM GRANULOCYTES NFR BLD AUTO: 0.3 %
KETONES UR QL STRIP: NEGATIVE
LDLC SERPL CALC-MCNC: 71 MG/DL (ref 50–140)
LEUKOCYTE ESTERASE UR QL STRIP: 75
LYMPHOCYTES # BLD AUTO: 2.29 X10(3)/MCL (ref 0.6–4.6)
LYMPHOCYTES NFR BLD AUTO: 34.6 %
MCH RBC QN AUTO: 29.5 PG (ref 27–31)
MCHC RBC AUTO-ENTMCNC: 34.7 G/DL (ref 33–36)
MCV RBC AUTO: 85.1 FL (ref 80–94)
MICROALBUMIN UR-MCNC: <5 UG/ML
MICROALBUMIN/CREAT RATIO PNL UR: NORMAL
MONOCYTES # BLD AUTO: 0.5 X10(3)/MCL (ref 0.1–1.3)
MONOCYTES NFR BLD AUTO: 7.6 %
MUCOUS THREADS URNS QL MICRO: ABNORMAL /LPF
NEUTROPHILS # BLD AUTO: 3.58 X10(3)/MCL (ref 2.1–9.2)
NEUTROPHILS NFR BLD AUTO: 54.1 %
NITRITE UR QL STRIP: NEGATIVE
NRBC BLD AUTO-RTO: 0 %
PH UR STRIP: 5 [PH]
PLATELET # BLD AUTO: 160 X10(3)/MCL (ref 130–400)
PMV BLD AUTO: 12.9 FL (ref 7.4–10.4)
POTASSIUM SERPL-SCNC: 4.9 MMOL/L (ref 3.5–5.1)
PROT SERPL-MCNC: 7 GM/DL (ref 6.4–8.3)
PROT UR QL STRIP: NEGATIVE
RBC # BLD AUTO: 5.29 X10(6)/MCL (ref 4.7–6.1)
RBC #/AREA URNS AUTO: ABNORMAL /HPF
SODIUM SERPL-SCNC: 141 MMOL/L (ref 136–145)
SP GR UR STRIP.AUTO: 1.01 (ref 1–1.03)
SQUAMOUS #/AREA URNS LPF: ABNORMAL /HPF
T4 FREE SERPL-MCNC: 0.87 NG/DL (ref 0.7–1.48)
TRIGL SERPL-MCNC: 69 MG/DL (ref 34–140)
TSH SERPL-ACNC: 0.96 UIU/ML (ref 0.35–4.94)
UROBILINOGEN UR STRIP-ACNC: NORMAL
VLDLC SERPL CALC-MCNC: 14 MG/DL
WBC # BLD AUTO: 6.61 X10(3)/MCL (ref 4.5–11.5)
WBC #/AREA URNS AUTO: ABNORMAL /HPF

## 2024-06-25 PROCEDURE — 1159F MED LIST DOCD IN RCRD: CPT | Mod: CPTII,,, | Performed by: NURSE PRACTITIONER

## 2024-06-25 PROCEDURE — 3008F BODY MASS INDEX DOCD: CPT | Mod: CPTII,,, | Performed by: NURSE PRACTITIONER

## 2024-06-25 PROCEDURE — 82570 ASSAY OF URINE CREATININE: CPT

## 2024-06-25 PROCEDURE — 3074F SYST BP LT 130 MM HG: CPT | Mod: CPTII,,, | Performed by: NURSE PRACTITIONER

## 2024-06-25 PROCEDURE — 99214 OFFICE O/P EST MOD 30 MIN: CPT | Mod: PBBFAC | Performed by: NURSE PRACTITIONER

## 2024-06-25 PROCEDURE — 83036 HEMOGLOBIN GLYCOSYLATED A1C: CPT

## 2024-06-25 PROCEDURE — 85025 COMPLETE CBC W/AUTO DIFF WBC: CPT

## 2024-06-25 PROCEDURE — 1160F RVW MEDS BY RX/DR IN RCRD: CPT | Mod: CPTII,,, | Performed by: NURSE PRACTITIONER

## 2024-06-25 PROCEDURE — 3078F DIAST BP <80 MM HG: CPT | Mod: CPTII,,, | Performed by: NURSE PRACTITIONER

## 2024-06-25 PROCEDURE — 84439 ASSAY OF FREE THYROXINE: CPT

## 2024-06-25 PROCEDURE — 80061 LIPID PANEL: CPT

## 2024-06-25 PROCEDURE — 99214 OFFICE O/P EST MOD 30 MIN: CPT | Mod: S$PBB,,, | Performed by: NURSE PRACTITIONER

## 2024-06-25 PROCEDURE — 81001 URINALYSIS AUTO W/SCOPE: CPT | Performed by: NURSE PRACTITIONER

## 2024-06-25 PROCEDURE — 80053 COMPREHEN METABOLIC PANEL: CPT

## 2024-06-25 PROCEDURE — 36415 COLL VENOUS BLD VENIPUNCTURE: CPT

## 2024-06-25 PROCEDURE — 84443 ASSAY THYROID STIM HORMONE: CPT

## 2024-06-25 RX ORDER — CYCLOBENZAPRINE HCL 10 MG
10 TABLET ORAL 3 TIMES DAILY PRN
Qty: 30 TABLET | Refills: 1 | Status: SHIPPED | OUTPATIENT
Start: 2024-06-25

## 2024-06-25 RX ORDER — HYDROCODONE BITARTRATE AND ACETAMINOPHEN 7.5; 325 MG/1; MG/1
1 TABLET ORAL NIGHTLY PRN
COMMUNITY
Start: 2024-04-10

## 2024-06-25 RX ORDER — GABAPENTIN 300 MG/1
300 CAPSULE ORAL 2 TIMES DAILY
Qty: 180 CAPSULE | Refills: 1 | Status: SHIPPED | OUTPATIENT
Start: 2024-06-25

## 2024-06-25 NOTE — PROGRESS NOTES
[facility]  Raul Campbell DNP     Patient ID: [id number]     Chief Complaint: Follow-up      HPI:     Edin Branch is a 43 y.o. male here today for follow up with PCP.     PmHx: TBO, DDD neck and lumbar spine-chronic pain, HTN.     Health Maintenance         Date Due Completion Date    High Dose Statin Never done ---    TETANUS VACCINE 06/25/2025 (Originally 12/20/1998) ---    Pneumococcal Vaccines (Age 0-64) (1 of 2 - PCV) 06/25/2025 (Originally 12/20/1986) ---    Influenza Vaccine (Season Ended) 09/01/2024 ---    Hemoglobin A1c (Diabetic Prevention Screening) 02/09/2026 2/9/2023    Lipid Panel 02/09/2028 2/9/2023            Past Medical History:   Diagnosis Date    Rheumatoid arthritis, unspecified         Past Surgical History:   Procedure Laterality Date    FINGER SURGERY          Social History     Tobacco Use    Smoking status: Every Day     Current packs/day: 0.50     Average packs/day: 0.5 packs/day for 28.5 years (14.2 ttl pk-yrs)     Types: Cigarettes     Start date: 1/1/1996    Smokeless tobacco: Never   Substance and Sexual Activity    Alcohol use: Yes     Alcohol/week: 6.0 standard drinks of alcohol     Types: 6 Cans of beer per week     Comment: 6 beers /weekend    Drug use: Not Currently    Sexual activity: Yes     Partners: Female        Current Outpatient Medications   Medication Instructions    aspirin/caffeine (CECILIA BACK AND BODY ORAL) Oral    cyclobenzaprine (FLEXERIL) 10 mg, Oral, 3 times daily PRN    docusate sodium (COLACE) 100 mg, Oral, 2 times daily PRN    gabapentin (NEURONTIN) 300 mg, Oral, 2 times daily    HYDROcodone-acetaminophen (NORCO) 7.5-325 mg per tablet 1 tablet, Oral, Nightly PRN    ibuprofen (ADVIL,MOTRIN) 800 mg, Oral, Every 8 hours PRN    loratadine (CLARITIN) 10 mg, Oral, Daily    promethazine-dextromethorphan (PROMETHAZINE-DM) 6.25-15 mg/5 mL Syrp 5 mLs, Oral, Every 6-8 hours PRN, May cause sedation.  Do not drive or operate heavy machinery.       Review  "of patient's allergies indicates:  No Known Allergies     Patient Care Team:  Raul Campbell FNP as PCP - General (Family Medicine)     Subjective:     Review of Systems   Constitutional:  Negative for appetite change, chills, diaphoresis and fever.   HENT:  Negative for ear pain, sinus pain and sore throat.    Eyes:  Negative for pain and visual disturbance.   Respiratory:  Negative for cough, shortness of breath and wheezing.    Cardiovascular:  Negative for chest pain, palpitations and leg swelling.   Gastrointestinal:  Negative for abdominal pain, blood in stool, diarrhea, nausea and vomiting.   Endocrine: Negative for cold intolerance.   Genitourinary:  Negative for difficulty urinating, dysuria, frequency and hematuria.   Musculoskeletal:  Positive for arthralgias and neck pain. Negative for joint swelling and myalgias.   Skin:  Negative for color change and rash.   Allergic/Immunologic: Negative.    Neurological: Negative.  Negative for dizziness, syncope, light-headedness and numbness.   Hematological: Negative.    Psychiatric/Behavioral: Negative.  Negative for dysphoric mood and suicidal ideas. The patient is not nervous/anxious.    All other systems reviewed and are negative.      12 point review of systems conducted, negative except as stated in the history of present illness. See HPI for details.    Objective:     Visit Vitals  /68 (BP Location: Right arm, Patient Position: Sitting, BP Method: Medium (Automatic))   Pulse 79   Temp 98.3 °F (36.8 °C) (Oral)   Resp 16   Ht 5' 10" (1.778 m)   Wt 91.5 kg (201 lb 12.8 oz)   BMI 28.96 kg/m²       Physical Exam  Vitals and nursing note reviewed.   Constitutional:       General: He is not in acute distress.     Appearance: He is not ill-appearing.   HENT:      Head: Normocephalic and atraumatic.      Mouth/Throat:      Mouth: Mucous membranes are moist.      Pharynx: Oropharynx is clear.   Eyes:      General: No scleral icterus.     Extraocular " Movements: Extraocular movements intact.      Conjunctiva/sclera: Conjunctivae normal.      Pupils: Pupils are equal, round, and reactive to light.   Neck:      Vascular: No carotid bruit.   Cardiovascular:      Rate and Rhythm: Normal rate and regular rhythm.      Heart sounds: No murmur heard.     No friction rub. No gallop.   Pulmonary:      Effort: Pulmonary effort is normal. No respiratory distress.      Breath sounds: Normal breath sounds. No wheezing, rhonchi or rales.   Abdominal:      General: Abdomen is flat. Bowel sounds are normal. There is no distension.      Palpations: Abdomen is soft. There is no mass.      Tenderness: There is no abdominal tenderness.   Musculoskeletal:         General: Normal range of motion.      Cervical back: Normal range of motion and neck supple.   Skin:     General: Skin is warm and dry.   Neurological:      General: No focal deficit present.      Mental Status: He is alert.   Psychiatric:         Mood and Affect: Mood normal.         Labs Reviewed:     Chemistry:  Lab Results   Component Value Date     07/07/2023    K 4.3 07/07/2023    BUN 10.3 07/07/2023    CREATININE 1.07 07/07/2023    EGFRNORACEVR >60 07/07/2023    GLUCOSE 93 07/07/2023    CALCIUM 9.2 07/07/2023    ALKPHOS 54 07/07/2023    LABPROT 6.9 07/07/2023    ALBUMIN 4.1 07/07/2023    BILIDIR 0.2 02/02/2022    IBILI 0.30 02/02/2022    AST 18 07/07/2023    ALT 18 07/07/2023    TSH 1.021 02/09/2023    PSA 0.91 02/09/2023        Lab Results   Component Value Date    HGBA1C 5.1 02/09/2023        Hematology:  Lab Results   Component Value Date    WBC 7.47 07/07/2023    HGB 14.9 07/07/2023    HCT 44.9 07/07/2023     07/07/2023       Lipid Panel:  Lab Results   Component Value Date    CHOL 178 02/09/2023    HDL 55 02/09/2023    LDL 75.00 02/09/2023    TRIG 242 (H) 02/09/2023    TOTALCHOLEST 3 02/09/2023        Urine:  Lab Results   Component Value Date    APPEARANCEUA Clear 07/07/2023    SGUA 1.019 07/07/2023     PROTEINUA Negative 07/07/2023    KETONESUA Negative 07/07/2023    LEUKOCYTESUR Negative 07/07/2023    RBCUA 0-5 07/07/2023    WBCUA 0-5 07/07/2023    BACTERIA Trace (A) 07/07/2023    SQEPUA Trace (A) 07/07/2023    HYALINECASTS None Seen 07/07/2023        Assessment:       ICD-10-CM ICD-9-CM   1. Cervical radiculopathy  M54.12 723.4   2. Primary hypertension  I10 401.9   3. Hypertriglyceridemia  E78.1 272.1        Plan:     1. Cervical radiculopathy  Assessment & Plan:  Reviewed recent MRI of cervical spine from 2023, he does have moderate to severe foraminal stenosis at the lower levels, no significant central canal stenosis but his symptoms do correlate with the MRI findings.  He would like a referral to pain management because he is not ready to commit to surgery.  He was referred to Neurosurgery from his previous PCP and went to [place], was offered a surgical procedure but he declined surgery at this time.  No red flags on exam, no neuromuscular deficits.  Also advised of conservative measures that may be helpful.  ER precautions advised.    Orders:  -     Ambulatory referral/consult to Pain Clinic; Future; Expected date: 07/02/2024  -     cyclobenzaprine (FLEXERIL) 10 MG tablet; Take 1 tablet (10 mg total) by mouth 3 (three) times daily as needed for Muscle spasms.  Dispense: 30 tablet; Refill: 1  -     gabapentin (NEURONTIN) 300 MG capsule; Take 1 capsule (300 mg total) by mouth 2 (two) times daily.  Dispense: 180 capsule; Refill: 1    2. Primary hypertension  Overview:  Blood pressure has been well-controlled currently without any antihypertensive medication.    Goal BP < 140/90, best goal is BP <130/80 consistently   Reduce the amount of salt in your diet, follow a 2 gm sodium, DASH diet daily            Lose weight if you are overweight or have obesity  Avoid drinking too much alcohol  Stop smoking  Exercise at least 30 minutes per day most days of the week      Orders:  -     CBC Auto Differential;  Future; Expected date: 06/25/2024  -     Comprehensive Metabolic Panel; Future; Expected date: 06/25/2024  -     TSH; Future; Expected date: 06/25/2024  -     Hemoglobin A1C; Future; Expected date: 06/25/2024  -     Urinalysis; Future; Expected date: 06/25/2024  -     T4, Free; Future; Expected date: 06/25/2024  -     Microalbumin/Creatinine Ratio, Urine; Future; Expected date: 06/25/2024    3. Hypertriglyceridemia  Assessment & Plan:  Counseled for low-sodium, low carb, low-cholesterol, good fat, Dash diet.  Recommend increasing fiber in the diet to lower LDL, increasing fish oil and fish such as salmon may help increase HDL good cholesterol.  Increasing aerobic activity as tolerated may also help lower LDL.  Healthy weight maintenance is advised, portion control, calorie counting, and discussed difference between complex carbs and simple carbs.          Orders:  -     Lipid Panel; Future; Expected date: 06/25/2024         Follow up in about 6 months (around 12/25/2024) for follow up. In addition to their scheduled follow up, the patient has also been instructed to follow up on as needed basis.     Future Appointments   Date Time Provider Department Center   11/1/2024  9:45 AM Kristine Oseguera DO [facility]    12/24/2024  9:40 AM [facility] Campbell FNP [facility]        [facility] [place]

## 2024-06-25 NOTE — ASSESSMENT & PLAN NOTE
Counseled for low-sodium, low carb, low-cholesterol, good fat, Dash diet.  Recommend increasing fiber in the diet to lower LDL, increasing fish oil and fish such as salmon may help increase HDL good cholesterol.  Increasing aerobic activity as tolerated may also help lower LDL.  Healthy weight maintenance is advised, portion control, calorie counting, and discussed difference between complex carbs and simple carbs.

## 2024-06-25 NOTE — ASSESSMENT & PLAN NOTE
Reviewed recent MRI of cervical spine from 2023, he does have moderate to severe foraminal stenosis at the lower levels, no significant central canal stenosis but his symptoms do correlate with the MRI findings.  He would like a referral to pain management because he is not ready to commit to surgery.  He was referred to Neurosurgery from his previous PCP and went to [place], was offered a surgical procedure but he declined surgery at this time.  No red flags on exam, no neuromuscular deficits.  Also advised of conservative measures that may be helpful.  ER precautions advised.

## 2024-07-09 ENCOUNTER — HOSPITAL ENCOUNTER (EMERGENCY)
Facility: HOSPITAL | Age: 44
Discharge: HOME | End: 2024-07-09
Attending: EMERGENCY MEDICINE
Payer: COMMERCIAL

## 2024-07-09 VITALS
OXYGEN SATURATION: 100 % | DIASTOLIC BLOOD PRESSURE: 96 MMHG | HEART RATE: 71 BPM | WEIGHT: 200.63 LBS | SYSTOLIC BLOOD PRESSURE: 143 MMHG | TEMPERATURE: 99 F | RESPIRATION RATE: 18 BRPM | BODY MASS INDEX: 28.72 KG/M2 | HEIGHT: 70 IN

## 2024-07-09 DIAGNOSIS — N61.0 CELLULITIS OF LEFT BREAST: Primary | ICD-10-CM

## 2024-07-09 PROCEDURE — 99283 EMERGENCY DEPT VISIT LOW MDM: CPT

## 2024-07-09 RX ORDER — CEFUROXIME AXETIL 500 MG/1
500 TABLET ORAL EVERY 12 HOURS
Qty: 14 TABLET | Refills: 0 | Status: SHIPPED | OUTPATIENT
Start: 2024-07-09 | End: 2024-07-16

## 2024-07-09 NOTE — Clinical Note
"Edin Magana" Jose Carlos was seen and treated in our emergency department on 7/9/2024.  He may return to work on 07/10/2024.       If you have any questions or concerns, please don't hesitate to call.       RN    "

## 2024-07-09 NOTE — ED PROVIDER NOTES
Encounter Date: 7/9/2024       History     Chief Complaint   Patient presents with    Breast Problem     PT W CO PAIN AND SWELLING TO LT BREAST X 3 DAYS.  DENIES NIPPLE DC.  ALSO CONCERNED OVER WHITE SPOT TO SCLERA OF LT EYE. DENIES VISION CHANGES.       Edin Branch is a  43 y.o. male with a history of rheumatoid arthritis who presents to the ED complaining of pain and swelling to left nipple x 3 days. Believes he may have gotten bitten by something, but pain is worse today and he is concerned about infection. Denies nipple discharge, breast swelling, skin changes, fevers, chills.     The history is provided by the patient.     Review of patient's allergies indicates:  No Known Allergies  Past Medical History:   Diagnosis Date    Rheumatoid arthritis, unspecified      Past Surgical History:   Procedure Laterality Date    FINGER SURGERY       Family History   Problem Relation Name Age of Onset    Cancer Mother      Heart attack Father      Diabetes Brother      Diabetes Maternal Grandmother      Hypertension Maternal Grandmother      Stomach cancer Maternal Grandfather      Heart attack Paternal Grandmother       Social History     Tobacco Use    Smoking status: Every Day     Current packs/day: 0.50     Average packs/day: 0.5 packs/day for 28.5 years (14.3 ttl pk-yrs)     Types: Cigarettes     Start date: 1/1/1996    Smokeless tobacco: Never   Substance Use Topics    Alcohol use: Yes     Alcohol/week: 6.0 standard drinks of alcohol     Types: 6 Cans of beer per week     Comment: 6 beers /weekend    Drug use: Not Currently     Review of Systems   Constitutional:  Negative for activity change, chills and fever.   HENT:  Negative for congestion and trouble swallowing.    Eyes:  Negative for photophobia and visual disturbance.   Respiratory:  Negative for chest tightness, shortness of breath and wheezing.    Cardiovascular:  Negative for chest pain, palpitations and leg swelling.   Gastrointestinal:  Negative for  abdominal pain, constipation, diarrhea, nausea and vomiting.   Genitourinary:  Negative for dysuria, frequency, hematuria and urgency.   Musculoskeletal:  Negative for arthralgias, back pain and gait problem.   Skin:  Negative for rash.   Neurological:  Negative for dizziness, syncope, weakness, light-headedness, numbness and headaches.   Psychiatric/Behavioral:  Negative for agitation and confusion. The patient is not nervous/anxious.        Physical Exam     Initial Vitals [07/09/24 0839]   BP Pulse Resp Temp SpO2   (!) 143/96 71 18 98.6 °F (37 °C) 100 %      MAP       --         Physical Exam    Nursing note and vitals reviewed.  Constitutional: He appears well-developed and well-nourished. No distress.   HENT:   Head: Normocephalic and atraumatic.   Mouth/Throat: No oropharyngeal exudate.   Eyes: EOM are normal. No scleral icterus.   Neck: Neck supple.   Normal range of motion.  Cardiovascular:  Normal rate and regular rhythm.           No murmur heard.  Pulmonary/Chest: No respiratory distress. He has no wheezes.   Swelling noted to left nipple with mild erythema and TTP. No drainage. No breast edema, erythema, or warmth. No skin changes.    Abdominal: Abdomen is soft. He exhibits no distension. There is no abdominal tenderness.   Musculoskeletal:         General: No edema. Normal range of motion.      Cervical back: Normal range of motion and neck supple.     Neurological: He is alert and oriented to person, place, and time. No cranial nerve deficit.   Skin: Skin is warm and dry. Capillary refill takes less than 2 seconds. No erythema.   Psychiatric: He has a normal mood and affect. Thought content normal.         ED Course   Procedures  Labs Reviewed - No data to display       Imaging Results    None          Medications - No data to display  Medical Decision Making  Suspect early cellulitis of breast/nipple. No fluctuance or drainage. Trial of warm compresses and cefuroxime BID x 7 days. Instructed to follow  up with PCP in 3 days. ED return precautions given. He verbalized understanding. All questions answered.     Risk  Prescription drug management.                                      Clinical Impression:  Final diagnoses:  [N61.0] Cellulitis of left breast (Primary)          ED Disposition Condition    Discharge Stable          ED Prescriptions       Medication Sig Dispense Start Date End Date Auth. Provider    cefUROXime (CEFTIN) 500 MG tablet Take 1 tablet (500 mg total) by mouth every 12 (twelve) hours. for 7 days 14 tablet 7/9/2024 7/16/2024 Starla Vázquez PA-C          Follow-up Information       Follow up With Specialties Details Why Contact Info    [facility] Emergency Medicine  If symptoms worsen [address] [facility] [id number] [phone]    Raul Campbell, JONYP Family Medicine In 3 days Hospital follow up [address] [facility] [address]  [phone]               Starla Vázquez PA-C  07/09/24 0992

## 2024-07-09 NOTE — DISCHARGE INSTRUCTIONS
Apply warm compresses to the area of the boil for 15 to 30 minutes at least 4 times each day.  After the boil has opened, wash the area with soap and water 1 to 2 times each day until it is healed. Cover the area with a clean bandage    It is important that you follow up with your primary care provider or specialist if indicated for further evaluation, workup, and treatment as necessary. The exam and treatment you received in Emergency Department was for an urgent problem and NOT INTENDED AS COMPLETE CARE. It is important that you FOLLOW UP with a doctor for ongoing care. If your symptoms become WORSE or you DO NOT IMPROVE and you are unable to reach your health care provider, you should RETURN to the Emergency Department.

## 2024-07-09 NOTE — ED PROVIDER NOTES
Encounter Date: 7/9/2024       History     Chief Complaint   Patient presents with    Breast Problem     PT W CO PAIN AND SWELLING TO LT BREAST X 3 DAYS.  DENIES NIPPLE DC.  ALSO CONCERNED OVER WHITE SPOT TO SCLERA OF LT EYE. DENIES VISION CHANGES.       HPI  Review of patient's allergies indicates:  No Known Allergies  Past Medical History:   Diagnosis Date    Rheumatoid arthritis, unspecified      Past Surgical History:   Procedure Laterality Date    FINGER SURGERY       Family History   Problem Relation Name Age of Onset    Cancer Mother      Heart attack Father      Diabetes Brother      Diabetes Maternal Grandmother      Hypertension Maternal Grandmother      Stomach cancer Maternal Grandfather      Heart attack Paternal Grandmother       Social History     Tobacco Use    Smoking status: Every Day     Current packs/day: 0.50     Average packs/day: 0.5 packs/day for 28.5 years (14.3 ttl pk-yrs)     Types: Cigarettes     Start date: 1/1/1996    Smokeless tobacco: Never   Substance Use Topics    Alcohol use: Yes     Alcohol/week: 6.0 standard drinks of alcohol     Types: 6 Cans of beer per week     Comment: 6 beers /weekend    Drug use: Not Currently     Review of Systems    Physical Exam     Initial Vitals [07/09/24 0839]   BP Pulse Resp Temp SpO2   (!) 143/96 71 18 98.6 °F (37 °C) 100 %      MAP       --         Physical Exam    ED Course   Procedures  Labs Reviewed - No data to display       Imaging Results    None          Medications - No data to display  Medical Decision Making  Risk  Prescription drug management.                                      Clinical Impression:  Final diagnoses:  [N61.0] Cellulitis of left breast (Primary)          ED Disposition Condition    Discharge Stable          ED Prescriptions       Medication Sig Dispense Start Date End Date Auth. Provider    cefUROXime (CEFTIN) 500 MG tablet Take 1 tablet (500 mg total) by mouth every 12 (twelve) hours. for 7 days 14 tablet 7/9/2024  7/16/2024 Starla Vázquez PA-C          Follow-up Information       Follow up With Specialties Details Why Contact Info    [facility] Emergency Medicine  If symptoms worsen [address] [facility] [address] [phone]    Raul Campbell, P Family Medicine In 3 days Hospital follow up [address] [facility] [id number] [phone]

## 2024-07-25 ENCOUNTER — TELEPHONE (OUTPATIENT)
Dept: INTERNAL MEDICINE | Facility: CLINIC | Age: 44
End: 2024-07-25
Payer: COMMERCIAL

## 2024-08-17 ENCOUNTER — HOSPITAL ENCOUNTER (EMERGENCY)
Facility: HOSPITAL | Age: 44
Discharge: HOME | End: 2024-08-18
Attending: FAMILY MEDICINE
Payer: COMMERCIAL

## 2024-08-17 DIAGNOSIS — S91.114A LACERATION WITHOUT FOREIGN BODY OF RIGHT LESSER TOE(S) WITHOUT DAMAGE TO NAIL, INITIAL ENCOUNTER: Primary | ICD-10-CM

## 2024-08-17 PROCEDURE — 99284 EMERGENCY DEPT VISIT MOD MDM: CPT | Mod: 25

## 2024-08-17 NOTE — Clinical Note
"Edin Magana" Jose Carlos was seen and treated in our emergency department on 8/17/2024.  He may return to work on 08/21/2024.       If you have any questions or concerns, please don't hesitate to call.      Starla Vázquez, LUKE"

## 2024-08-18 VITALS
HEIGHT: 68 IN | BODY MASS INDEX: 30.99 KG/M2 | SYSTOLIC BLOOD PRESSURE: 124 MMHG | DIASTOLIC BLOOD PRESSURE: 76 MMHG | TEMPERATURE: 98 F | OXYGEN SATURATION: 100 % | HEART RATE: 78 BPM | RESPIRATION RATE: 18 BRPM | WEIGHT: 204.5 LBS

## 2024-08-18 PROCEDURE — 12001 RPR S/N/AX/GEN/TRNK 2.5CM/<: CPT

## 2024-08-18 PROCEDURE — 90471 IMMUNIZATION ADMIN: CPT | Performed by: PHYSICIAN ASSISTANT

## 2024-08-18 PROCEDURE — 25000003 PHARM REV CODE 250: Performed by: PHYSICIAN ASSISTANT

## 2024-08-18 PROCEDURE — 63600175 PHARM REV CODE 636 W HCPCS: Performed by: PHYSICIAN ASSISTANT

## 2024-08-18 PROCEDURE — 90715 TDAP VACCINE 7 YRS/> IM: CPT | Performed by: PHYSICIAN ASSISTANT

## 2024-08-18 RX ORDER — LIDOCAINE HYDROCHLORIDE 10 MG/ML
5 INJECTION, SOLUTION EPIDURAL; INFILTRATION; INTRACAUDAL; PERINEURAL
Status: COMPLETED | OUTPATIENT
Start: 2024-08-18 | End: 2024-08-18

## 2024-08-18 RX ADMIN — TETANUS TOXOID, REDUCED DIPHTHERIA TOXOID AND ACELLULAR PERTUSSIS VACCINE, ADSORBED 0.5 ML: 5; 2.5; 8; 8; 2.5 SUSPENSION INTRAMUSCULAR at 12:08

## 2024-08-18 RX ADMIN — LIDOCAINE HYDROCHLORIDE 50 MG: 10 INJECTION, SOLUTION EPIDURAL; INFILTRATION; INTRACAUDAL; PERINEURAL at 12:08

## 2024-08-18 NOTE — ED PROVIDER NOTES
"Encounter Date: 8/17/2024       History     Chief Complaint   Patient presents with    Laceration     States right last toe got caught on pants and "split open" in between last 2 toes.       Edin Branch is a 43 y.o. male who presents to the ED with laceration to right little toe. Reports that he was putting on pants prior to arrival when his toe got caught and "split open". Last tetanus was approximately 6 years ago. Denies numbness, paresthesias, gait abnormality.     The history is provided by the patient.     Review of patient's allergies indicates:  No Known Allergies  Past Medical History:   Diagnosis Date    Rheumatoid arthritis, unspecified      Past Surgical History:   Procedure Laterality Date    FINGER SURGERY       Family History   Problem Relation Name Age of Onset    Cancer Mother      Heart attack Father      Diabetes Brother      Diabetes Maternal Grandmother      Hypertension Maternal Grandmother      Stomach cancer Maternal Grandfather      Heart attack Paternal Grandmother       Social History     Tobacco Use    Smoking status: Every Day     Current packs/day: 0.50     Average packs/day: 0.5 packs/day for 28.6 years (14.3 ttl pk-yrs)     Types: Cigarettes     Start date: 1/1/1996    Smokeless tobacco: Never   Substance Use Topics    Alcohol use: Yes     Alcohol/week: 6.0 standard drinks of alcohol     Types: 6 Cans of beer per week     Comment: 6 beers /weekend    Drug use: Not Currently     Review of Systems   Constitutional:  Negative for activity change, chills and fever.   HENT:  Negative for congestion and trouble swallowing.    Eyes:  Negative for photophobia and visual disturbance.   Respiratory:  Negative for chest tightness, shortness of breath and wheezing.    Cardiovascular:  Negative for chest pain, palpitations and leg swelling.   Gastrointestinal:  Negative for abdominal pain, constipation, diarrhea, nausea and vomiting.   Genitourinary:  Negative for dysuria, frequency, hematuria " and urgency.   Musculoskeletal:  Negative for arthralgias, back pain and gait problem.   Skin:  Positive for wound. Negative for color change and rash.   Neurological:  Negative for dizziness, syncope, weakness, light-headedness, numbness and headaches.   Psychiatric/Behavioral:  Negative for agitation and confusion. The patient is not nervous/anxious.        Physical Exam     Initial Vitals [08/17/24 2321]   BP Pulse Resp Temp SpO2   122/79 80 17 98.4 °F (36.9 °C) 100 %      MAP       --         Physical Exam    Nursing note and vitals reviewed.  Constitutional: He appears well-developed and well-nourished. No distress.   HENT:   Head: Normocephalic and atraumatic.   Mouth/Throat: No oropharyngeal exudate.   Eyes: EOM are normal. No scleral icterus.   Neck: Neck supple.   Normal range of motion.  Cardiovascular:  Normal rate and regular rhythm.           No murmur heard.  Pulmonary/Chest: No respiratory distress. He has no wheezes.   Abdominal: Abdomen is soft. He exhibits no distension. There is no abdominal tenderness.   Musculoskeletal:         General: No edema. Normal range of motion.      Cervical back: Normal range of motion and neck supple.     Neurological: He is alert and oriented to person, place, and time. No cranial nerve deficit.   Skin: Skin is warm and dry. Capillary refill takes less than 2 seconds. Laceration noted. No erythema.   Right 5th toe plantar aspect with ~ 1 cm laceration. Bleeding controlled. No tendon or nerve involvement. Full AROM of all joints in this digit. NVI, 2+ radial pulses. Brisk cap refill.     Psychiatric: He has a normal mood and affect. Thought content normal.         ED Course   Lac Repair    Date/Time: 8/18/2024 1:12 AM    Performed by: Starla Vázquez PA-C  Authorized by: Iam Friedman MD    Consent:     Consent obtained:  Emergent situation    Consent given by:  Patient    Risks discussed:  Pain, poor cosmetic result and poor wound healing  Universal  protocol:     Patient identity confirmed:  Verbally with patient  Anesthesia:     Anesthesia method:  Local infiltration    Local anesthetic:  Lidocaine 1% w/o epi  Laceration details:     Location:  Toe    Toe location:  R little toe    Length (cm):  1  Pre-procedure details:     Preparation:  Patient was prepped and draped in usual sterile fashion  Exploration:     Wound exploration: wound explored through full range of motion and entire depth of wound visualized    Treatment:     Area cleansed with:  Povidone-iodine and saline  Skin repair:     Repair method:  Sutures    Suture size:  5-0    Suture material:  Nylon    Suture technique:  Simple interrupted    Number of sutures:  4  Approximation:     Approximation:  Close  Repair type:     Repair type:  Simple  Post-procedure details:     Dressing:  Non-adherent dressing    Procedure completion:  Tolerated well, no immediate complications    Labs Reviewed - No data to display       Imaging Results    None          Medications   Tdap (BOOSTRIX) vaccine injection 0.5 mL (0.5 mLs Intramuscular Given 8/18/24 0013)   LIDOcaine (PF) 10 mg/ml (1%) injection 50 mg (50 mg Infiltration Given 8/18/24 0022)     Medical Decision Making  Laceration repaired with 4 simple interrupted sutures. Pt tolerated well with no immediate complications. Stable for discharge home. Instructed to return to ED in 7 days for suture removal. Earlier return precautions given. He verbalized understanding. All questions answered.     Risk  Prescription drug management.                                      Clinical Impression:  Final diagnoses:  [S91.114A] Laceration without foreign body of right lesser toe(s) without damage to nail, initial encounter (Primary)          ED Disposition Condition    Discharge Stable          ED Prescriptions    None       Follow-up Information       Follow up With Specialties Details Why Contact Info    [facility] Emergency Medicine In 1 week  For suture removal [address] [facility] [id number] [phone]             Starla Vázquez PA-C  08/18/24 0113

## 2024-08-25 ENCOUNTER — OFFICE VISIT (OUTPATIENT)
Dept: URGENT CARE | Facility: CLINIC | Age: 44
End: 2024-08-25
Payer: COMMERCIAL

## 2024-08-25 VITALS
HEART RATE: 78 BPM | WEIGHT: 202.63 LBS | OXYGEN SATURATION: 100 % | RESPIRATION RATE: 18 BRPM | TEMPERATURE: 98 F | BODY MASS INDEX: 30.71 KG/M2 | HEIGHT: 68 IN | SYSTOLIC BLOOD PRESSURE: 132 MMHG | DIASTOLIC BLOOD PRESSURE: 78 MMHG

## 2024-08-25 DIAGNOSIS — Z48.02 ENCOUNTER FOR REMOVAL OF SUTURES: Primary | ICD-10-CM

## 2024-08-25 PROCEDURE — 99214 OFFICE O/P EST MOD 30 MIN: CPT | Mod: PBBFAC | Performed by: FAMILY MEDICINE

## 2024-08-25 PROCEDURE — 99212 OFFICE O/P EST SF 10 MIN: CPT | Mod: S$PBB,,, | Performed by: FAMILY MEDICINE

## 2024-08-25 NOTE — LETTER
August 25, 2024      [facility]  [facility] [facility] [address]  Phone: [phone]       Patient: Edin Branch   YOB: 1980  Date of Visit: 08/25/2024    To Whom It May Concern:    Sheeba Branch  was at [facility] on 08/25/2024. The patient may return to work/school on AUG 26 2024 with no restrictions, must wear shoe given at urgent care. If you have any questions or concerns, or if I can be of further assistance, please do not hesitate to contact me.    Sincerely,    RO WEBB MD

## 2024-08-25 NOTE — PROGRESS NOTES
"Subjective:       Patient ID: Edin Branch is a 43 y.o. male.    Vitals:  height is 5' 8" (1.727 m) and weight is 91.9 kg (202 lb 9.6 oz). His temperature is 98.4 °F (36.9 °C). His blood pressure is 132/78 and his pulse is 78. His respiration is 18 and oxygen saturation is 100%.     Chief Complaint: Suture / Staple Removal (RT foot 5th toe  sutures placed 8/17 in ER )    Patient in urgent Care Clinic today for suture removal.  Had a superficial laceration on the right foot repaired on 8/17.  No postop issues.      All other systems are negative    Chart reviewed    Objective:   Physical Exam   Constitutional: He appears well-developed.  Non-toxic appearance. He does not appear ill. No distress.   Abdominal: Normal appearance.   Neurological: He is alert.   Skin: Skin is not diaphoretic.         Comments: Well approximated wound over the plantar aspect of the 5th little toe.  Small amount of maceration interdigital webspace.  4 sutures in place, all removed.   Nursing note and vitals reviewed.        Assessment:     1. Encounter for removal of sutures            Plan:   Wound care instructions discussed.  Follow-up with PCP or return to urgent care if needed.      Encounter for removal of sutures        Please note: This chart was completed via voice to text dictation. It may contain typographical/word recognition errors. If there are any questions, please contact the provider for final clarification.    "

## 2024-09-19 ENCOUNTER — OFFICE VISIT (OUTPATIENT)
Dept: URGENT CARE | Facility: CLINIC | Age: 44
End: 2024-09-19
Payer: COMMERCIAL

## 2024-09-19 VITALS
TEMPERATURE: 98 F | SYSTOLIC BLOOD PRESSURE: 145 MMHG | HEART RATE: 89 BPM | DIASTOLIC BLOOD PRESSURE: 92 MMHG | OXYGEN SATURATION: 100 % | WEIGHT: 207.38 LBS | HEIGHT: 68 IN | RESPIRATION RATE: 19 BRPM | BODY MASS INDEX: 31.43 KG/M2

## 2024-09-19 DIAGNOSIS — J06.9 UPPER RESPIRATORY TRACT INFECTION, UNSPECIFIED TYPE: Primary | ICD-10-CM

## 2024-09-19 DIAGNOSIS — J02.9 SORE THROAT: ICD-10-CM

## 2024-09-19 LAB
CTP QC/QA: YES
CTP QC/QA: YES
MOLECULAR STREP A: NEGATIVE
SARS-COV-2 AG RESP QL IA.RAPID: NEGATIVE

## 2024-09-19 PROCEDURE — 87811 SARS-COV-2 COVID19 W/OPTIC: CPT | Mod: PBBFAC | Performed by: NURSE PRACTITIONER

## 2024-09-19 PROCEDURE — 99215 OFFICE O/P EST HI 40 MIN: CPT | Mod: PBBFAC | Performed by: NURSE PRACTITIONER

## 2024-09-19 PROCEDURE — 87651 STREP A DNA AMP PROBE: CPT | Mod: PBBFAC | Performed by: NURSE PRACTITIONER

## 2024-09-19 PROCEDURE — 99203 OFFICE O/P NEW LOW 30 MIN: CPT | Mod: S$PBB,,, | Performed by: NURSE PRACTITIONER

## 2024-09-19 RX ORDER — ALBUTEROL SULFATE 90 UG/1
2 INHALANT RESPIRATORY (INHALATION) EVERY 6 HOURS PRN
Qty: 1 G | Refills: 0 | Status: SHIPPED | OUTPATIENT
Start: 2024-09-19

## 2024-09-19 RX ORDER — PROMETHAZINE HYDROCHLORIDE AND DEXTROMETHORPHAN HYDROBROMIDE 6.25; 15 MG/5ML; MG/5ML
10 SYRUP ORAL
Qty: 120 ML | Refills: 0 | Status: SHIPPED | OUTPATIENT
Start: 2024-09-19

## 2024-09-19 RX ORDER — FLUTICASONE PROPIONATE 50 MCG
2 SPRAY, SUSPENSION (ML) NASAL DAILY
Qty: 18.2 ML | Refills: 0 | Status: SHIPPED | OUTPATIENT
Start: 2024-09-19

## 2024-09-19 NOTE — LETTER
September 19, 2024      [facility]  [facility] [facility] [address]  Phone: [phone]       Patient: Edin Branch   YOB: 1980  Date of Visit: 09/19/2024    To Whom It May Concern:    Sheeba Branch  was at [facility] on 09/19/2024. The patient may return to work/school on 9/21/2024 with no restrictions. If you have any questions or concerns, or if I can be of further assistance, please do not hesitate to contact me.    Sincerely,    EMMANUEL Dukes

## 2024-09-19 NOTE — PROGRESS NOTES
"  Subjective:      Patient ID: Edin Branch is a 43 y.o. male.    Vitals:  height is 5' 8" (1.727 m) and weight is 94.1 kg (207 lb 6.4 oz). His oral temperature is 98.4 °F (36.9 °C). His blood pressure is 145/92 (abnormal) and his pulse is 89. His respiration is 19 and oxygen saturation is 100%.     Chief Complaint: Sore Throat (X1 day sore throat, congestion, fatigued)    Sore Throat   This is a new problem. Episode onset: Three days. The problem has been unchanged. Neither (Sore throat subsided yesterday) side of throat is experiencing more pain than the other. There has been no fever. The pain is mild. Associated symptoms include congestion and coughing. Pertinent negatives include no diarrhea, trouble swallowing or vomiting. Associated symptoms comments: Itchy ears. He has tried acetaminophen for the symptoms. The treatment provided no relief.    patient presents with cough sore throat nasal congestion and feeling tired, and ear itching for the last 3 days.  Patient reports sore throat subsided yesterday after taking Tylenol and over-the-counter medications.  Patient denies fever, headache, dizziness, weakness shortness for breath or chest pain, stomach pain nausea or vomiting.    HENT:  Positive for congestion and sore throat. Negative for trouble swallowing.    Respiratory:  Positive for cough.    Gastrointestinal:  Negative for vomiting and diarrhea.   Skin:  Negative for erythema.      Objective:     Physical Exam   Constitutional: He is oriented to person, place, and time. He appears well-developed. He is cooperative.  Non-toxic appearance. He does not appear ill. No distress. awake  HENT:   Head: Normocephalic and atraumatic.   Ears:   Right Ear: Hearing normal. No swelling or tenderness. Tympanic membrane is injected. Tympanic membrane is not erythematous, not retracted and not bulging. No middle ear effusion.   Left Ear: Hearing and tympanic membrane normal. No swelling or tenderness. Tympanic membrane " is not injected, not erythematous, not retracted and not bulging.  No middle ear effusion.   Nose: Rhinorrhea and congestion present. No purulent discharge. Right sinus exhibits no maxillary sinus tenderness and no frontal sinus tenderness. Left sinus exhibits no maxillary sinus tenderness and no frontal sinus tenderness.   Mouth/Throat: Uvula is midline and mucous membranes are normal. Mucous membranes are moist. Posterior oropharyngeal erythema and cobblestoning present. No oropharyngeal exudate. Tonsils are 1+ on the right. Tonsils are 1+ on the left. No tonsillar exudate.   Eyes: Conjunctivae are normal. Right eye exhibits no discharge. Left eye exhibits no discharge. Extraocular movement intact   Neck: Neck supple. No neck rigidity present.   Cardiovascular: Normal rate, regular rhythm and normal pulses.   Pulmonary/Chest: Effort normal and breath sounds normal. No stridor. No respiratory distress. He has no wheezes. He has no rhonchi. He has no rales. He exhibits no tenderness.   Abdominal: Normal appearance and bowel sounds are normal. He exhibits no distension. Soft.   Genitourinary:    Penis normal.     Musculoskeletal: Normal range of motion.         General: Normal range of motion.   Lymphadenopathy:     He has no cervical adenopathy.   Neurological: He is alert and oriented to person, place, and time.   Skin: Skin is warm, dry, not diaphoretic and no rash. Capillary refill takes less than 2 seconds. No erythema   Psychiatric: His behavior is normal. Mood, judgment and thought content normal.   Nursing note and vitals reviewed.      Assessment:     1. Upper respiratory tract infection, unspecified type    2. Sore throat      Results for orders placed or performed in visit on 09/19/24   POCT Strep A, Molecular   Result Value Ref Range    Molecular Strep A, POC Negative Negative     Acceptable Yes    SARS Coronavirus 2 Antigen, POCT Manual Read   Result Value Ref Range    SARS Coronavirus 2  Antigen Negative Negative     Acceptable Yes          Plan:   ER precautions given and discussed  COVID test is negative today in clinic.  Return to clinic or follow up with PCP  if symptoms persist greater than 7 days.  Also discussed with patient, symptoms may be related to URI.  Encouraged to remain hydrated, avoid any known allergy triggers or irritants, may take Tylenol or Motrin as needed for discomfort if not contraindicated.    Upper respiratory tract infection, unspecified type  -     albuterol (PROVENTIL HFA) 90 mcg/actuation inhaler; Inhale 2 puffs into the lungs every 6 (six) hours as needed for Wheezing or Shortness of Breath. Rescue  Dispense: 1 g; Refill: 0  -     promethazine-dextromethorphan (PROMETHAZINE-DM) 6.25-15 mg/5 mL Syrp; Take 10 mLs by mouth every 6 to 8 hours as needed (Cough). May cause sedation.  Do not drive or operate heavy machinery after taking this medication.  Dispense: 120 mL; Refill: 0    Sore throat  -     POCT Strep A, Molecular  -     SARS Coronavirus 2 Antigen, POCT Manual Read          Medical Decision Making:   Differential Diagnosis:   Sinusitis

## 2024-09-19 NOTE — PATIENT INSTRUCTIONS
Please follow instructions on patient education material.      Return to urgent care in 2 to 3 days if symptoms are not improving, immediately if you develop any new or worsening symptoms.   -nasal saline lavage  - OTC cold/flu products as desired for symptoms  - Plenty of fluids  - Humidified air  - Tylenol or Motrin for pain/fever    Go to the ER if you experience chest pain with shortness of breath, shortness of breath when moving around your house, high fevers 103.0+, excessive vomiting/diarrhea, or general distress.

## 2025-02-06 ENCOUNTER — OFFICE VISIT (OUTPATIENT)
Dept: URGENT CARE | Facility: CLINIC | Age: 45
End: 2025-02-06
Payer: COMMERCIAL

## 2025-02-06 VITALS
BODY MASS INDEX: 32.13 KG/M2 | TEMPERATURE: 98 F | HEIGHT: 68 IN | DIASTOLIC BLOOD PRESSURE: 91 MMHG | RESPIRATION RATE: 18 BRPM | HEART RATE: 82 BPM | WEIGHT: 212 LBS | SYSTOLIC BLOOD PRESSURE: 127 MMHG | OXYGEN SATURATION: 100 %

## 2025-02-06 DIAGNOSIS — M54.42 ACUTE BACK PAIN WITH SCIATICA, LEFT: Primary | ICD-10-CM

## 2025-02-06 PROCEDURE — 99213 OFFICE O/P EST LOW 20 MIN: CPT | Mod: S$PBB,,, | Performed by: NURSE PRACTITIONER

## 2025-02-06 PROCEDURE — 99215 OFFICE O/P EST HI 40 MIN: CPT | Mod: PBBFAC | Performed by: NURSE PRACTITIONER

## 2025-02-06 PROCEDURE — 63600175 PHARM REV CODE 636 W HCPCS: Mod: JZ,TB

## 2025-02-06 RX ORDER — METHOCARBAMOL 500 MG/1
500 TABLET, FILM COATED ORAL 4 TIMES DAILY
Qty: 40 TABLET | Refills: 0 | Status: SHIPPED | OUTPATIENT
Start: 2025-02-06 | End: 2025-02-16

## 2025-02-06 RX ORDER — KETOROLAC TROMETHAMINE 30 MG/ML
60 INJECTION, SOLUTION INTRAMUSCULAR; INTRAVENOUS
Status: COMPLETED | OUTPATIENT
Start: 2025-02-06 | End: 2025-02-06

## 2025-02-06 RX ADMIN — KETOROLAC TROMETHAMINE 60 MG: 30 INJECTION, SOLUTION INTRAMUSCULAR at 01:02

## 2025-02-06 NOTE — PROGRESS NOTES
"Subjective:      Patient ID: Edin Branch is a 44 y.o. male.    Vitals:  height is 5' 8" (1.727 m) and weight is 96.2 kg (212 lb). His temperature is 98 °F (36.7 °C). His blood pressure is 127/91 (abnormal) and his pulse is 82. His respiration is 18 and oxygen saturation is 100%.     Chief Complaint: Flank Pain (Pt c/o Lt sided flank pain radiating down leg x Tuesday )    Flank Pain     as stated in chief complaint.  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  Patient reports known history of chronic back pain in which he is prescribed gabapentin and has been taking Motrin for pain with minimal relief for the last 2 days.  Patient denies fever, shortness for breath or chest pain, dizziness, weakness, numbness and tingling in his extremities, stomach pain, nausea or vomiting.  dysuria.    Genitourinary:  Positive for flank pain.      Objective:     Physical Exam   Constitutional: He appears well-developed.  Non-toxic appearance. He does not appear ill. No distress.   HENT:   Head: Atraumatic.   Nose: No purulent discharge. Right sinus exhibits no maxillary sinus tenderness and no frontal sinus tenderness. Left sinus exhibits no maxillary sinus tenderness and no frontal sinus tenderness.   Mouth/Throat: Uvula is midline.   Eyes: Right eye exhibits no discharge. Left eye exhibits no discharge. Extraocular movement intact   Neck: Neck supple. No neck rigidity present.   Cardiovascular: Regular rhythm.   Pulmonary/Chest: Effort normal and breath sounds normal. No respiratory distress. He has no wheezes. He has no rhonchi. He has no rales.   Musculoskeletal:         General: Tenderness present. No swelling, deformity or signs of injury.      Thoracic back: Normal.      Lumbar back: He exhibits tenderness and spasm. He exhibits normal range of motion, no bony tenderness, no deformity and no laceration.        Back:       Right lower leg: No edema.      Left lower leg: " No edema.        Legs:    Lymphadenopathy:     He has no cervical adenopathy.   Neurological: He is alert.   Skin: Skin is warm, dry and not diaphoretic.   Psychiatric: His behavior is normal.   Nursing note and vitals reviewed.      Assessment:     1. Acute back pain with sciatica, left        Plan:   ER precautions given and discussed  Prescription management muscle relaxer and IM injection x1 dose today given in clinic.  Use ice 10-15 min off for these first 24-48 hours  Rest your back   Consider back brace and support with good shoes when working   Take Rx medications as prescribed and use only when needed, not on a schedule.  Heating pad   Acute back pain with sciatica, left  -     ketorolac injection 60 mg  -     methocarbamoL (ROBAXIN) 500 MG Tab; Take 1 tablet (500 mg total) by mouth 4 (four) times daily. for 10 days  Dispense: 40 tablet; Refill: 0          Medical Decision Making:   Differential Diagnosis:   Lumbar strain,

## 2025-02-06 NOTE — LETTER
February 6, 2025      [facility]  [facility] [place] [place] [address]  Phone: [phone]       Patient: Edin Branch   YOB: 1980  Date of Visit: 02/06/2025    To Whom It May Concern:    Sheeba Branch  was at [facility] on 02/06/2025. The patient may return to work/school on 02/10/25 with no restrictions. If you have any questions or concerns, or if I can be of further assistance, please do not hesitate to contact me.    Sincerely,    RENEE Valdez NP

## 2025-03-26 ENCOUNTER — OFFICE VISIT (OUTPATIENT)
Dept: URGENT CARE | Facility: CLINIC | Age: 45
End: 2025-03-26
Payer: COMMERCIAL

## 2025-03-26 VITALS
WEIGHT: 215.31 LBS | BODY MASS INDEX: 32.63 KG/M2 | HEIGHT: 68 IN | SYSTOLIC BLOOD PRESSURE: 126 MMHG | HEART RATE: 88 BPM | DIASTOLIC BLOOD PRESSURE: 89 MMHG | TEMPERATURE: 99 F | OXYGEN SATURATION: 99 % | RESPIRATION RATE: 18 BRPM

## 2025-03-26 DIAGNOSIS — J06.9 UPPER RESPIRATORY TRACT INFECTION, UNSPECIFIED TYPE: Primary | ICD-10-CM

## 2025-03-26 DIAGNOSIS — J30.2 SEASONAL ALLERGIC RHINITIS, UNSPECIFIED TRIGGER: ICD-10-CM

## 2025-03-26 LAB
CTP QC/QA: YES
CTP QC/QA: YES
POC MOLECULAR INFLUENZA A AGN: NEGATIVE
POC MOLECULAR INFLUENZA B AGN: NEGATIVE
SARS-COV-2 RDRP RESP QL NAA+PROBE: NEGATIVE

## 2025-03-26 PROCEDURE — 87502 INFLUENZA DNA AMP PROBE: CPT | Mod: PBBFAC

## 2025-03-26 PROCEDURE — 87635 SARS-COV-2 COVID-19 AMP PRB: CPT | Mod: PBBFAC

## 2025-03-26 PROCEDURE — 99213 OFFICE O/P EST LOW 20 MIN: CPT | Mod: S$PBB,,,

## 2025-03-26 PROCEDURE — 63600175 PHARM REV CODE 636 W HCPCS

## 2025-03-26 PROCEDURE — 99215 OFFICE O/P EST HI 40 MIN: CPT | Mod: PBBFAC

## 2025-03-26 RX ORDER — DEXAMETHASONE SODIUM PHOSPHATE 4 MG/ML
8 INJECTION, SOLUTION INTRA-ARTICULAR; INTRALESIONAL; INTRAMUSCULAR; INTRAVENOUS; SOFT TISSUE
Status: COMPLETED | OUTPATIENT
Start: 2025-03-26 | End: 2025-03-26

## 2025-03-26 RX ORDER — DEXBROMPHENIRAMINE MALEATE, PHENYLEPHRINE HYDROCHLORIDE 2; 7.5 MG/1; MG/1
2-7.5 TABLET ORAL 4 TIMES DAILY PRN
Qty: 28 TABLET | Refills: 0 | Status: SHIPPED | OUTPATIENT
Start: 2025-03-26 | End: 2025-04-02

## 2025-03-26 RX ORDER — PROMETHAZINE HYDROCHLORIDE AND DEXTROMETHORPHAN HYDROBROMIDE 6.25; 15 MG/5ML; MG/5ML
5 SYRUP ORAL EVERY 4 HOURS PRN
Qty: 118 ML | Refills: 0 | Status: SHIPPED | OUTPATIENT
Start: 2025-03-26 | End: 2025-04-05

## 2025-03-26 RX ADMIN — DEXAMETHASONE SODIUM PHOSPHATE 8 MG: 4 INJECTION, SOLUTION INTRA-ARTICULAR; INTRALESIONAL; INTRAMUSCULAR; INTRAVENOUS; SOFT TISSUE at 04:03

## 2025-03-26 NOTE — LETTER
March 26, 2025      [facility]  [facility] [address]  Phone: [phone]       Patient: Edin Branch   YOB: 1980  Date of Visit: 03/26/2025    To Whom It May Concern:    Sheeba Branch  was at [facility] on 03/26/2025. The patient may return to work/school on 3/29/2025 with no restrictions. If you have any questions or concerns, or if I can be of further assistance, please do not hesitate to contact me.    Sincerely,    EMMANUEL Villanueva

## 2025-03-27 NOTE — PROGRESS NOTES
"Subjective:       Patient ID: Edin Branch is a 44 y.o. male.    Vitals:  height is 5' 8" (1.727 m) and weight is 97.7 kg (215 lb 4.8 oz). His oral temperature is 99.2 °F (37.3 °C). His blood pressure is 126/89 and his pulse is 88. His respiration is 18 and oxygen saturation is 99%.     Chief Complaint: URI (Congestion, cough, fatigue, headache, scratchy throat and vomiting started yesterday morning.)    44-year-old male presents to the clinic with complaints of congestion, cough, fatigue, headache, post-tussive vomiting that started yesterday morning.  Patient states that he normally has seasonal allergies and feels that this is a terrible flare-up of his seasonal allergies.  Patient states he has used Flonase nasal spray and taken loratadine with little relief.  Patient states he was sent home from work because of feeling ill today and is going to need a doctor's excuse at today's visit.    All other systems are negative    Chart reviewed    Objective:   Physical Exam   Constitutional: He appears well-developed.  Non-toxic appearance. He does not appear ill. No distress.   HENT:   Head: Normocephalic and atraumatic.   Ears:   Right Ear: Hearing, tympanic membrane, external ear and ear canal normal.   Left Ear: Hearing, tympanic membrane, external ear and ear canal normal.   Nose: Mucosal edema and rhinorrhea present. No purulent discharge. Right sinus exhibits no maxillary sinus tenderness and no frontal sinus tenderness. Left sinus exhibits no maxillary sinus tenderness and no frontal sinus tenderness.   Mouth/Throat: Uvula is midline. Posterior oropharyngeal edema and posterior oropharyngeal erythema present.   Eyes: Right eye exhibits no discharge. Left eye exhibits no discharge. Extraocular movement intact   Neck: Neck supple. No neck rigidity present.   Cardiovascular: Regular rhythm.   Pulmonary/Chest: Effort normal and breath sounds normal. No respiratory distress. He has no wheezes. He has no rhonchi. He " has no rales.   Lymphadenopathy:     He has no cervical adenopathy.   Neurological: He is alert.   Skin: Skin is warm, dry and not diaphoretic.   Psychiatric: His behavior is normal.   Nursing note and vitals reviewed.      Assessment:     1. Upper respiratory tract infection, unspecified type    2. Seasonal allergic rhinitis, unspecified trigger          Results for orders placed or performed in visit on 03/26/25   POCT COVID-19 Rapid Screening    Collection Time: 03/26/25  3:49 PM   Result Value Ref Range    POC Rapid COVID Negative Negative     Acceptable Yes    POCT Influenza A/B Molecular    Collection Time: 03/26/25  3:50 PM   Result Value Ref Range    POC Molecular Influenza A Ag Negative Negative    POC Molecular Influenza B Ag Negative Negative     Acceptable Yes        Plan:     If a test result is still pending, we will notify you if it is positive.  However, you can see all of your results on your patient portal.    Please drink plenty of fluids.  Please get plenty of rest.  Please return here or go to the Emergency Department for any concerns or worsening of condition.  If you were prescribed antibiotics, please take them to completion.  If you were given wait & see antibiotics, please wait 4-7 days before taking them, and only take them if your symptoms have not improved, or your symptoms have worsened.  If you do begin taking the antibiotics, please take them to completion.  If you were prescribed a narcotic medication, do not drive or operate heavy equipment or machinery while taking these medications.  If you were given a steroid shot in the clinic and have also been given a prescription for a steroid such as Prednisone or a Medrol Dose Pack, please begin taking them tomorrow.  If you do not have Hypertension or any history of palpitations, it is ok to take over the counter Sudafed or Mucinex D or Allegra-D or Claritin-D or Zyrtec-D.  If you do take one of the above, it  is ok to combine that with plain over the counter Mucinex or Allegra or Claritin or Zyrtec.  If for example you are taking Zyrtec -D, you can combine that with Mucinex, but not Mucinex-D.  If you are taking Mucinex-D, you can combine that with plain Allegra or Claritin or Zyrtec.   If you do have Hypertension or palpitations, it is safe to take Coricidin HBP for relief of sinus symptoms.  If not allergic and not contraindicated because of your medical conditions, please take over the counter Tylenol (Acetaminophen) and/or Motrin (Ibuprofen) as directed for control of pain and/or fever.  Please follow up with your primary care doctor or specialist as needed.  If you  smoke, please stop smoking.      Upper respiratory tract infection, unspecified type  -     Cancel: SARS Coronavirus 2 Antigen, POCT Manual Read  -     POCT Influenza A/B Molecular  -     POCT COVID-19 Rapid Screening    Seasonal allergic rhinitis, unspecified trigger  -     dexAMETHasone injection 8 mg  -     promethazine-dextromethorphan (PROMETHAZINE-DM) 6.25-15 mg/5 mL Syrp; Take 5 mLs by mouth every 4 (four) hours as needed (cough).  Dispense: 118 mL; Refill: 0  -     dexbrompheniramine-phenyleph (ALAHIST PE) 2-7.5 mg Tab; Take 2-7.5 mg by mouth 4 (four) times daily as needed (congestion).  Dispense: 28 tablet; Refill: 0        Please note: This chart was completed via voice to text dictation. It may contain typographical/word recognition errors. If there are any questions, please contact the provider for final clarification.

## 2025-06-20 ENCOUNTER — OFFICE VISIT (OUTPATIENT)
Dept: URGENT CARE | Facility: CLINIC | Age: 45
End: 2025-06-20
Payer: COMMERCIAL

## 2025-06-20 VITALS
WEIGHT: 209 LBS | BODY MASS INDEX: 31.67 KG/M2 | HEIGHT: 68 IN | HEART RATE: 100 BPM | RESPIRATION RATE: 20 BRPM | SYSTOLIC BLOOD PRESSURE: 137 MMHG | TEMPERATURE: 99 F | OXYGEN SATURATION: 98 % | DIASTOLIC BLOOD PRESSURE: 90 MMHG

## 2025-06-20 DIAGNOSIS — R09.81 NASAL CONGESTION: ICD-10-CM

## 2025-06-20 DIAGNOSIS — B97.89 ACUTE VIRAL SINUSITIS: Primary | ICD-10-CM

## 2025-06-20 DIAGNOSIS — J01.90 ACUTE VIRAL SINUSITIS: Primary | ICD-10-CM

## 2025-06-20 PROCEDURE — 99215 OFFICE O/P EST HI 40 MIN: CPT | Mod: PBBFAC

## 2025-06-20 RX ORDER — DEXAMETHASONE SODIUM PHOSPHATE 10 MG/ML
8 INJECTION INTRAMUSCULAR; INTRAVENOUS
Status: COMPLETED | OUTPATIENT
Start: 2025-06-20 | End: 2025-06-20

## 2025-06-20 RX ADMIN — DEXAMETHASONE SODIUM PHOSPHATE 8 MG: 10 INJECTION INTRAMUSCULAR; INTRAVENOUS at 07:06

## 2025-06-20 NOTE — LETTER
June 20, 2025      [facility]  [facility] [facility] [address]  Phone: [phone]       Patient: Edin Branch   YOB: 1980  Date of Visit: 06/20/2025    To Whom It May Concern:    Sheeba Branch  was at [facility] on 06/20/2025. The patient may return to work/school on 06/21/20205 with no restrictions. If you have any questions or concerns, or if I can be of further assistance, please do not hesitate to contact me.    Sincerely,         EMMANUEL Garcia

## 2025-06-21 NOTE — PROGRESS NOTES
"Subjective:       Patient ID: Edin Branch is a 44 y.o. male.    Vitals:  height is 5' 8" (1.727 m) and weight is 94.8 kg (209 lb). His oral temperature is 98.8 °F (37.1 °C). His blood pressure is 137/90 (abnormal) and his pulse is 100. His respiration is 20 and oxygen saturation is 98%.     Chief Complaint: URI (Cough, nasal congestion, nausea and weakness x 3 days.)    45 y/o AAM presents to  alone, he reports symptoms x 3 days which have slightly improved, has taken over-the-counter measures, reports possible ill exposure while at work.    URI   Associated symptoms include congestion, coughing (Resolved) and nausea. Pertinent negatives include no diarrhea, ear pain, sore throat or vomiting.       Constitution: Positive for generalized weakness. Negative for chills, fatigue and fever.   HENT:  Positive for congestion and sinus pressure. Negative for ear pain and sore throat.    Respiratory:  Positive for cough (Resolved).    Gastrointestinal:  Positive for nausea. Negative for vomiting and diarrhea.       Objective:      Physical Exam   Constitutional: He is oriented to person, place, and time. He is cooperative. He is easily aroused.  Non-toxic appearance. He does not appear ill. obesityawake  HENT:   Head: Normocephalic and atraumatic.   Ears:   Right Ear: Tympanic membrane and ear canal normal.   Left Ear: Tympanic membrane and ear canal normal.   Nose: Mucosal edema (Nasal turbinates edematous x 2) and rhinorrhea (Copious amount of clear nasal discharge) present. Right sinus exhibits maxillary sinus tenderness. Left sinus exhibits maxillary sinus tenderness.   Mouth/Throat: Uvula is midline and mucous membranes are normal. Cobblestoning present.   Eyes: Right conjunctiva is injected. Left conjunctiva is injected.   Neck: Neck supple.   Cardiovascular: Normal rate.   Pulmonary/Chest: Effort normal and breath sounds normal.   Neurological: He is alert, oriented to person, place, and time and easily aroused. " GCS eye subscore is 4. GCS verbal subscore is 5. GCS motor subscore is 6.   Skin: Skin is warm, dry, intact and not diaphoretic. Capillary refill takes less than 2 seconds.   Psychiatric: His speech is normal and behavior is normal.   Nursing note and vitals reviewed.        Assessment:       1. Acute viral sinusitis    2. Nasal congestion          Plan:     We will treat as viral sinusitis, encouraged patient to continue with Claritin and Flonase, avoid any known sinus triggers or irritants, return to clinic if symptoms does not improve in 5 days or worsens.    Acute viral sinusitis    Nasal congestion  -     dexAMETHasone injection 8 mg  -     dexbrompheniramine-phenylep-DM 2-10-20 mg Tab; Take 1 tablet by mouth every 8 (eight) hours as needed (congestion).  Dispense: 15 tablet; Refill: 0